# Patient Record
Sex: FEMALE | Race: OTHER | Employment: FULL TIME | URBAN - METROPOLITAN AREA
[De-identification: names, ages, dates, MRNs, and addresses within clinical notes are randomized per-mention and may not be internally consistent; named-entity substitution may affect disease eponyms.]

---

## 2017-02-02 ENCOUNTER — HOSPITAL ENCOUNTER (OUTPATIENT)
Age: 37
Discharge: HOME | End: 2017-02-02
Attending: EMERGENCY MEDICINE
Payer: COMMERCIAL

## 2017-02-02 VITALS
TEMPERATURE: 98 F | SYSTOLIC BLOOD PRESSURE: 139 MMHG | HEART RATE: 82 BPM | RESPIRATION RATE: 16 BRPM | HEIGHT: 67 IN | OXYGEN SATURATION: 100 % | WEIGHT: 250 LBS | BODY MASS INDEX: 39.24 KG/M2 | DIASTOLIC BLOOD PRESSURE: 88 MMHG

## 2017-02-02 DIAGNOSIS — G43.101 MIGRAINE WITH AURA AND WITH STATUS MIGRAINOSUS, NOT INTRACTABLE: Primary | ICD-10-CM

## 2017-02-02 PROCEDURE — 99214 OFFICE O/P EST MOD 30 MIN: CPT

## 2017-02-02 PROCEDURE — 99213 OFFICE O/P EST LOW 20 MIN: CPT

## 2017-02-02 RX ORDER — PREDNISONE 20 MG/1
60 TABLET ORAL ONCE
Status: COMPLETED | OUTPATIENT
Start: 2017-02-02 | End: 2017-02-02

## 2017-02-02 RX ORDER — ONDANSETRON 4 MG/1
4 TABLET, ORALLY DISINTEGRATING ORAL EVERY 4 HOURS PRN
Qty: 10 TABLET | Refills: 0 | Status: SHIPPED | OUTPATIENT
Start: 2017-02-02 | End: 2017-02-09

## 2017-02-02 RX ORDER — METOCLOPRAMIDE 10 MG/1
10 TABLET ORAL EVERY 6 HOURS PRN
Qty: 10 TABLET | Refills: 0 | Status: SHIPPED | OUTPATIENT
Start: 2017-02-02 | End: 2017-03-04

## 2017-02-02 RX ORDER — ONDANSETRON 4 MG/1
4 TABLET, ORALLY DISINTEGRATING ORAL ONCE
Status: COMPLETED | OUTPATIENT
Start: 2017-02-02 | End: 2017-02-02

## 2017-02-02 NOTE — ED PROVIDER NOTES
Patient Seen in: [facility] Immediate Care In [address] [place] [address]    History   Patient presents with:  Headache    Stated Complaint: headache    HPI    Patient is a 30-year-old female with an extensive history of migraines who presents with complaints of rig Temp 02/02/17 0959 97.6 °F (36.4 °C)   Temp src --    SpO2 02/02/17 0959 100 %   O2 Device 02/02/17 0959 None (Room air)       Current:/88 mmHg  Pulse 82  Temp(Src) 97.6 °F (36.4 °C)  Resp 16  Ht 170.2 cm (5' 7\")  Wt 113.399 kg  BMI 39.15 kg/m2  S

## 2017-10-24 ENCOUNTER — LAB ENCOUNTER (OUTPATIENT)
Dept: LAB | Age: 37
End: 2017-10-24
Attending: FAMILY MEDICINE
Payer: COMMERCIAL

## 2017-10-24 ENCOUNTER — OFFICE VISIT (OUTPATIENT)
Dept: FAMILY MEDICINE CLINIC | Facility: CLINIC | Age: 37
End: 2017-10-24

## 2017-10-24 VITALS
DIASTOLIC BLOOD PRESSURE: 70 MMHG | HEART RATE: 81 BPM | SYSTOLIC BLOOD PRESSURE: 107 MMHG | BODY MASS INDEX: 45.43 KG/M2 | WEIGHT: 276 LBS | HEIGHT: 65.5 IN

## 2017-10-24 DIAGNOSIS — Z00.00 ROUTINE MEDICAL EXAM: ICD-10-CM

## 2017-10-24 DIAGNOSIS — N94.3 PREMENSTRUAL SYMPTOM: ICD-10-CM

## 2017-10-24 DIAGNOSIS — Z00.00 ROUTINE MEDICAL EXAM: Primary | ICD-10-CM

## 2017-10-24 DIAGNOSIS — E55.9 VITAMIN D DEFICIENCY: ICD-10-CM

## 2017-10-24 PROCEDURE — 80053 COMPREHEN METABOLIC PANEL: CPT

## 2017-10-24 PROCEDURE — 36415 COLL VENOUS BLD VENIPUNCTURE: CPT

## 2017-10-24 PROCEDURE — 99395 PREV VISIT EST AGE 18-39: CPT | Performed by: FAMILY MEDICINE

## 2017-10-24 PROCEDURE — 85025 COMPLETE CBC W/AUTO DIFF WBC: CPT

## 2017-10-24 PROCEDURE — 84443 ASSAY THYROID STIM HORMONE: CPT

## 2017-10-24 PROCEDURE — 80061 LIPID PANEL: CPT

## 2017-10-24 PROCEDURE — 82306 VITAMIN D 25 HYDROXY: CPT

## 2017-10-24 RX ORDER — ONDANSETRON 4 MG/1
4 TABLET, ORALLY DISINTEGRATING ORAL EVERY 8 HOURS PRN
Qty: 9 TABLET | Refills: 0 | Status: SHIPPED | OUTPATIENT
Start: 2017-10-24 | End: 2018-02-02

## 2017-10-24 RX ORDER — NAPROXEN 500 MG/1
500 TABLET ORAL 2 TIMES DAILY WITH MEALS
Qty: 60 TABLET | Refills: 1 | Status: SHIPPED | OUTPATIENT
Start: 2017-10-24 | End: 2018-02-02

## 2017-10-24 NOTE — PROGRESS NOTES
HPI:   Marycruz Girard is a 30year old female who presents for a complete physical exam.    Regular check up. Menses has been regular. Reports unsure why weight gain. More stress at work.    Has been trying to get pregnant for past year - does have regular adenopathy,no bruits  CHEST: no chest tenderness  LUNGS: clear to auscultation  CARDIO: RRR without murmur  GI: good BS's,no masses, HSM or tenderness  MUSCULOSKELETAL: back is not tender,FROM of the back  EXTREMITIES: no cyanosis, clubbing or edema  NEURO

## 2017-10-30 NOTE — PROGRESS NOTES
Decreased vitamin D levels - should take daily over the counter vitamin D 2000 units. Cholesterol is elevated. Work on dietary changes and exercise. Also white blood cells were elevated. Are you feeling sick? Like cold symptoms?

## 2017-11-15 ENCOUNTER — TELEPHONE (OUTPATIENT)
Dept: OTHER | Age: 37
End: 2017-11-15

## 2017-11-15 ENCOUNTER — PATIENT MESSAGE (OUTPATIENT)
Dept: FAMILY MEDICINE CLINIC | Facility: CLINIC | Age: 37
End: 2017-11-15

## 2017-11-15 NOTE — TELEPHONE ENCOUNTER
From: Aicha Zuniga  To:  Lety Gamble MD  Sent: 11/15/2017  2:31 PM CST  Subject: Prescription Noralyn Orf Dr. Estrella Barbosa - since my last visit with you, I found out I am pregnant :), but I've had bad headaches since.  I have only taken Tylenol since

## 2017-11-15 NOTE — TELEPHONE ENCOUNTER
please see pt mychart message below. I called pt and she stated that she usually gets the headaches with her menstrual period  and you had started her on Naproxen but she found out she was pregnant.  Pt stated that her pervious Dr will give her Stephanieeno

## 2017-11-15 NOTE — TELEPHONE ENCOUNTER
From: Aisha Reed  To: Rc Vidal MD  Sent: 11/15/2017 2:31 PM CST  Subject: Prescription Gwen Ramp Dr. Maldonado Mondragon - since my last visit with you, I found out I am pregnant :), but I've had bad headaches since.  I have only taken Tylenol since I

## 2017-11-17 ENCOUNTER — TELEPHONE (OUTPATIENT)
Dept: PEDIATRICS CLINIC | Facility: CLINIC | Age: 37
End: 2017-11-17

## 2017-12-09 ENCOUNTER — NURSE ONLY (OUTPATIENT)
Dept: OBGYN CLINIC | Facility: CLINIC | Age: 37
End: 2017-12-09

## 2017-12-09 ENCOUNTER — TELEPHONE (OUTPATIENT)
Dept: OBGYN CLINIC | Facility: CLINIC | Age: 37
End: 2017-12-09

## 2017-12-09 ENCOUNTER — LAB ENCOUNTER (OUTPATIENT)
Dept: LAB | Facility: HOSPITAL | Age: 37
End: 2017-12-09
Attending: OBSTETRICS & GYNECOLOGY
Payer: COMMERCIAL

## 2017-12-09 VITALS — BODY MASS INDEX: 46.09 KG/M2 | HEIGHT: 65.5 IN | WEIGHT: 280 LBS

## 2017-12-09 DIAGNOSIS — Z34.81 ENCOUNTER FOR SUPERVISION OF OTHER NORMAL PREGNANCY IN FIRST TRIMESTER: Primary | ICD-10-CM

## 2017-12-09 DIAGNOSIS — Z34.81 ENCOUNTER FOR SUPERVISION OF OTHER NORMAL PREGNANCY IN FIRST TRIMESTER: ICD-10-CM

## 2017-12-09 PROCEDURE — 87086 URINE CULTURE/COLONY COUNT: CPT

## 2017-12-09 PROCEDURE — 86901 BLOOD TYPING SEROLOGIC RH(D): CPT

## 2017-12-09 PROCEDURE — 81025 URINE PREGNANCY TEST: CPT | Performed by: OBSTETRICS & GYNECOLOGY

## 2017-12-09 PROCEDURE — 87340 HEPATITIS B SURFACE AG IA: CPT

## 2017-12-09 PROCEDURE — 86780 TREPONEMA PALLIDUM: CPT

## 2017-12-09 PROCEDURE — 86900 BLOOD TYPING SEROLOGIC ABO: CPT

## 2017-12-09 PROCEDURE — 82950 GLUCOSE TEST: CPT

## 2017-12-09 PROCEDURE — 86850 RBC ANTIBODY SCREEN: CPT

## 2017-12-09 PROCEDURE — 85025 COMPLETE CBC W/AUTO DIFF WBC: CPT

## 2017-12-09 PROCEDURE — 36415 COLL VENOUS BLD VENIPUNCTURE: CPT

## 2017-12-09 PROCEDURE — 87389 HIV-1 AG W/HIV-1&-2 AB AG IA: CPT

## 2017-12-09 PROCEDURE — 86762 RUBELLA ANTIBODY: CPT

## 2017-12-09 NOTE — TELEPHONE ENCOUNTER
Pt had her OBN today. Pt takes Tylenol with codeine for when she gets a bad tension headache. Pt has taken it twice since she has been pregnant. Sent to MD on Call, Anahi Pereira. Can pt continue to take it as needed.

## 2017-12-09 NOTE — PROGRESS NOTES
Pt seen for OBN appt today with no complaints. Normal PN labs ordered, 1 hr.   Pt advised all labs must be completed and resulted prior to MD appt. Pt walked to  to schedule NPN appt with MD.    BMI 45.2.   Informed pt that MD will discuss if pt Sickle Cell Disease or trait No    Ted-Sachs Disease No    Thalassemia No    Other inherited genetic or chromosomal disorders No    Patient or baby's father had a child with birth defects not listed above No    Previous miscarriages or stillborn No

## 2017-12-11 ENCOUNTER — TELEPHONE (OUTPATIENT)
Dept: OBGYN CLINIC | Facility: CLINIC | Age: 37
End: 2017-12-11

## 2017-12-11 DIAGNOSIS — O28.9 ABNORMAL FINDINGS ON ANTENATAL SCREENING: Primary | ICD-10-CM

## 2017-12-11 NOTE — TELEPHONE ENCOUNTER
Pt informed of KCBs recs and verbalized understanding. Pt also advised to increase fluid intake as well.

## 2017-12-11 NOTE — TELEPHONE ENCOUNTER
----- Message from Madison Rivas MD sent at 12/11/2017  8:04 AM CST -----  Patient did not pass GTT and will need three hour GTT

## 2017-12-12 NOTE — TELEPHONE ENCOUNTER
PT NOTIFIED OF RESULTS AND RECS. INSTRUCTIONS GIVEN FOR FASTING AND PHONE NUMBER GIVEN FOR [address]. ENCOURAGED TO CALL BACK WITH ANY QUESTIONS OR CONCERNS.

## 2017-12-14 ENCOUNTER — INITIAL PRENATAL (OUTPATIENT)
Dept: OBGYN CLINIC | Facility: CLINIC | Age: 37
End: 2017-12-14

## 2017-12-14 ENCOUNTER — TELEPHONE (OUTPATIENT)
Dept: OBGYN CLINIC | Facility: CLINIC | Age: 37
End: 2017-12-14

## 2017-12-14 VITALS
DIASTOLIC BLOOD PRESSURE: 82 MMHG | BODY MASS INDEX: 46 KG/M2 | WEIGHT: 280 LBS | HEART RATE: 76 BPM | SYSTOLIC BLOOD PRESSURE: 140 MMHG

## 2017-12-14 DIAGNOSIS — Z34.91 ENCOUNTER FOR SUPERVISION OF NORMAL PREGNANCY IN FIRST TRIMESTER, UNSPECIFIED GRAVIDITY: Primary | ICD-10-CM

## 2017-12-14 PROCEDURE — 81002 URINALYSIS NONAUTO W/O SCOPE: CPT | Performed by: OBSTETRICS & GYNECOLOGY

## 2017-12-14 PROCEDURE — 90686 IIV4 VACC NO PRSV 0.5 ML IM: CPT | Performed by: OBSTETRICS & GYNECOLOGY

## 2017-12-14 PROCEDURE — 90471 IMMUNIZATION ADMIN: CPT | Performed by: OBSTETRICS & GYNECOLOGY

## 2017-12-15 NOTE — PROGRESS NOTES
31 yo  @ 9w0d by LMP here for NOB visit. No complaints. BMI 45. Discussed sleep study and echo. Discussed Level 2 and growth scan. Pt undecided on genetics. Failed 1h gtt and has 3h gtt scheduled in 2 days. PE wnl. LPS 16- neg/neg.   PE

## 2017-12-15 NOTE — TELEPHONE ENCOUNTER
Patient's order for level 2/growth Us was routed to dmg . Patient given [phone] to call and schedule testing.

## 2017-12-15 NOTE — TELEPHONE ENCOUNTER
LMTCB. ORDERS PLACED FOR SLEEP STUDY AND MATERNAL ECHO. PT NEEDS TO CALL [phone] TO SCHEDULE SLEEP STUDY AND NEEDS TO CALL CENTRAL SCHEDULING, [phone] TO SCHEDULE MATERNAL ECHO.   PT SHOULD ALSO CALL HER INSURANCE TO FIND OUT IF ANY PRIOR AUTH I

## 2017-12-15 NOTE — TELEPHONE ENCOUNTER
Patient needs level 2 with monthly growth scans, maternal echo, and sleep study for BMI 45. Please coordinate. Thanks!

## 2017-12-16 ENCOUNTER — LAB ENCOUNTER (OUTPATIENT)
Dept: LAB | Age: 37
End: 2017-12-16
Attending: OBSTETRICS & GYNECOLOGY
Payer: COMMERCIAL

## 2017-12-16 DIAGNOSIS — O28.9 ABNORMAL FINDINGS ON ANTENATAL SCREENING: ICD-10-CM

## 2017-12-16 PROCEDURE — 82952 GTT-ADDED SAMPLES: CPT

## 2017-12-16 PROCEDURE — 82951 GLUCOSE TOLERANCE TEST (GTT): CPT

## 2017-12-16 PROCEDURE — 36415 COLL VENOUS BLD VENIPUNCTURE: CPT

## 2017-12-19 ENCOUNTER — HOSPITAL ENCOUNTER (OUTPATIENT)
Dept: ULTRASOUND IMAGING | Age: 37
Discharge: HOME | End: 2017-12-19
Attending: OBSTETRICS & GYNECOLOGY
Payer: COMMERCIAL

## 2017-12-19 DIAGNOSIS — Z34.91 ENCOUNTER FOR SUPERVISION OF NORMAL PREGNANCY IN FIRST TRIMESTER, UNSPECIFIED GRAVIDITY: ICD-10-CM

## 2017-12-19 PROCEDURE — 76801 OB US < 14 WKS SINGLE FETUS: CPT | Performed by: OBSTETRICS & GYNECOLOGY

## 2017-12-20 NOTE — TELEPHONE ENCOUNTER
PT NOTIFIED OF NEED FOR SLEEP STUDY AND ECHO AND PHONE NUMBERS GIVEN FOR BOTH. PT WILL CHECK WITH HER INSURANCE AND CALL US BACK IF SHE NEEDS A PRIOR AUTH FOR THE SLEEP STUDY.

## 2017-12-27 ENCOUNTER — TELEPHONE (OUTPATIENT)
Dept: OBGYN CLINIC | Facility: CLINIC | Age: 37
End: 2017-12-27

## 2017-12-27 NOTE — TELEPHONE ENCOUNTER
PER PT STATE IS IT OK TO TAKE THE TYLENOL WITH CODEINE FOR A MIGRAINE HEADACHE / SHE STATE SHE'S IN A LOT OF PAIN / PLS ADV

## 2017-12-27 NOTE — TELEPHONE ENCOUNTER
Pt is 10w6d, reports a bad migraine and asking if she can take her Tylenol with codeine. Pt reports HA and pain behind right eye. She has tried coffee, pop, tylenol, and a cold gel mask with no relief.  Pt did ask about using this med at her OBN visit and w

## 2017-12-28 NOTE — TELEPHONE ENCOUNTER
Reviewed with VIDYA on call, see note. Pt aware OK to use T3 today but if another HA occurs she should keep a diary and let us know how often they come, as we prefer she does not use T3 often. Pt verbalized understanding.

## 2017-12-28 NOTE — TELEPHONE ENCOUNTER
Reviewed chart and OK to use rarely. She stated only twice since pregnant. This is acceptable use. We'll follow frequency of HA's.

## 2018-01-13 ENCOUNTER — ROUTINE PRENATAL (OUTPATIENT)
Dept: OBGYN CLINIC | Facility: CLINIC | Age: 38
End: 2018-01-13

## 2018-01-13 VITALS
SYSTOLIC BLOOD PRESSURE: 137 MMHG | WEIGHT: 278.81 LBS | DIASTOLIC BLOOD PRESSURE: 81 MMHG | HEART RATE: 82 BPM | BODY MASS INDEX: 46 KG/M2

## 2018-01-13 DIAGNOSIS — Z34.91 ENCOUNTER FOR SUPERVISION OF NORMAL PREGNANCY IN FIRST TRIMESTER, UNSPECIFIED GRAVIDITY: Primary | ICD-10-CM

## 2018-01-13 LAB
MULTISTIX LOT#: NORMAL NUMERIC
PH, URINE: 5 (ref 4.5–8)
SPECIFIC GRAVITY: 1.01 (ref 1–1.03)
UROBILINOGEN,SEMI-QN: 0 MG/DL (ref 0–1.9)

## 2018-01-13 PROCEDURE — 81002 URINALYSIS NONAUTO W/O SCOPE: CPT | Performed by: OBSTETRICS & GYNECOLOGY

## 2018-02-02 ENCOUNTER — HOSPITAL ENCOUNTER (OUTPATIENT)
Dept: PERINATAL CARE | Facility: HOSPITAL | Age: 38
Discharge: HOME | End: 2018-02-02
Attending: OBSTETRICS & GYNECOLOGY
Payer: COMMERCIAL

## 2018-02-02 VITALS — HEART RATE: 91 BPM | SYSTOLIC BLOOD PRESSURE: 153 MMHG | DIASTOLIC BLOOD PRESSURE: 68 MMHG

## 2018-02-02 DIAGNOSIS — O09.522 ELDERLY MULTIGRAVIDA IN SECOND TRIMESTER: ICD-10-CM

## 2018-02-02 PROBLEM — O09.529 AMA (ADVANCED MATERNAL AGE) MULTIGRAVIDA 35+: Status: ACTIVE | Noted: 2018-02-02

## 2018-02-02 PROBLEM — O09.529 AMA (ADVANCED MATERNAL AGE) MULTIGRAVIDA 35+ (HCC): Status: ACTIVE | Noted: 2018-02-02

## 2018-02-02 PROCEDURE — 76816 OB US FOLLOW-UP PER FETUS: CPT | Performed by: OBSTETRICS & GYNECOLOGY

## 2018-02-02 PROCEDURE — 76805 OB US >/= 14 WKS SNGL FETUS: CPT | Performed by: OBSTETRICS & GYNECOLOGY

## 2018-02-02 PROCEDURE — 99244 OFF/OP CNSLTJ NEW/EST MOD 40: CPT | Performed by: OBSTETRICS & GYNECOLOGY

## 2018-02-02 NOTE — PROGRESS NOTES
Outpatient Maternal-Fetal Medicine Consultation    Dear Dr. Anastasia Mahoney,    Thank you for requesting ultrasound evaluation and maternal fetal medicine consultation on your patient Radha Pena.   As you are aware she is a 30year old female with a Gavin Norse revealed fetal measurements consistent with dates. Posterior placenta. I interpreted the results and reviewed them with the patient.       DISCUSSION  During her visit we discussed and reviewed the following issues:  ADVANCED MATERNAL AGE    Background  [facility] is almost 30 percent, compared to almost 48 percent in women over age 36 to 39 and almost [de-identified] percent in women age 48 to 61.           Fetal Death        A decision analysis tool using data from the [employer]  [employer] predicte the diagnostic accuracy of these tests as well as the procedure associated loss rate (1:500 for genetic amniocentesis). She ultimately does not desire invasive genetic testing.      Non-invasive Pregnancy Testing (NIPT)  I reviewed current non-invasive s subsequent type 2 diabetes. _ passed her early 3 hour GTT but will need to have this repeated in the third trimester. An association between obesity and hypertensive disorders during pregnancy has been consistently reported.   In particular, [employer] 8-18 lbs.   We discussed the role of mild to moderate exercise, healthy food choices and appropriate portions sized to help achieve this goal.  Excess weight gain is associated with higher rates of gestational diabetes, hypertensive complications, fetal mac

## 2018-02-10 ENCOUNTER — ROUTINE PRENATAL (OUTPATIENT)
Dept: OBGYN CLINIC | Facility: CLINIC | Age: 38
End: 2018-02-10

## 2018-02-10 ENCOUNTER — TELEPHONE (OUTPATIENT)
Dept: OBGYN CLINIC | Facility: CLINIC | Age: 38
End: 2018-02-10

## 2018-02-10 VITALS
BODY MASS INDEX: 46 KG/M2 | DIASTOLIC BLOOD PRESSURE: 84 MMHG | SYSTOLIC BLOOD PRESSURE: 137 MMHG | WEIGHT: 279 LBS | HEART RATE: 74 BPM

## 2018-02-10 DIAGNOSIS — O99.212 MATERNAL MORBID OBESITY, ANTEPARTUM, SECOND TRIMESTER (HCC): ICD-10-CM

## 2018-02-10 DIAGNOSIS — E66.01 MATERNAL MORBID OBESITY, ANTEPARTUM, SECOND TRIMESTER (HCC): ICD-10-CM

## 2018-02-10 DIAGNOSIS — Z34.92 ENCOUNTER FOR SUPERVISION OF NORMAL PREGNANCY IN SECOND TRIMESTER, UNSPECIFIED GRAVIDITY: Primary | ICD-10-CM

## 2018-02-10 LAB
MULTISTIX LOT#: NORMAL NUMERIC
PH, URINE: 7 (ref 4.5–8)
SPECIFIC GRAVITY: 1.01 (ref 1–1.03)

## 2018-02-10 PROCEDURE — 81002 URINALYSIS NONAUTO W/O SCOPE: CPT | Performed by: OBSTETRICS & GYNECOLOGY

## 2018-02-10 NOTE — PROGRESS NOTES
No complaints. No VB. Level 2 US already scheduled. Needs sleep study and echo. Will send to nutritionist. Take BP at home and call if >140/90.   RTC 4 wks

## 2018-02-13 ENCOUNTER — TELEPHONE (OUTPATIENT)
Dept: OBGYN CLINIC | Facility: CLINIC | Age: 38
End: 2018-02-13

## 2018-02-13 NOTE — TELEPHONE ENCOUNTER
LMTCB. ORDER PLACED FOR SLEEP STUDY AND MATERNAL ECHO. PT TO CALL [phone] TO SCHEDULE HER SLEEP STUDY. PT SHOULD ALSO CHECK WITH HER INSURANCE TO FIND OUT IF HER INSURANCE REQUIRES ANY KIND PRIOR AUTHORIZATION FOR THE SLEEP STUDY.   PT NEEDS TO CA

## 2018-02-13 NOTE — TELEPHONE ENCOUNTER
Please notify patient MFM recs to see dietician during pregnancy to address diet. Please coordinate.

## 2018-02-13 NOTE — TELEPHONE ENCOUNTER
Lmtcb. Calling to inform patient that her referral for the dietician was entered and to give scheduling information [phone].

## 2018-02-19 NOTE — TELEPHONE ENCOUNTER
Pt informed of recs below and verbalized understanding. Pt stated she told Brooklynn Garza at time of PN visit that she is unable to do to sleep study at this time because she is working nights. Pt stated she is unsure when she will be able to do the sleep study.  Pt s

## 2018-02-20 NOTE — TELEPHONE ENCOUNTER
Noted. To be discussed at next PNV. Do they do sleep studies on the weekend ever? Or does her partner ever have a week night off?

## 2018-02-28 NOTE — TELEPHONE ENCOUNTER
LMTCB AT HOME #. SPOKE WITH JULIANNE AT [facility]. THEY DO SLEEP STUDIES FRI AND SAT NIGHTS AND ON SUN NIGHTS AT [facility].

## 2018-03-02 ENCOUNTER — HOSPITAL ENCOUNTER (OUTPATIENT)
Dept: PERINATAL CARE | Facility: HOSPITAL | Age: 38
Discharge: HOME | End: 2018-03-02
Attending: OBSTETRICS & GYNECOLOGY
Payer: COMMERCIAL

## 2018-03-02 ENCOUNTER — TELEPHONE (OUTPATIENT)
Dept: OBGYN CLINIC | Facility: CLINIC | Age: 38
End: 2018-03-02

## 2018-03-02 VITALS
HEART RATE: 105 BPM | RESPIRATION RATE: 16 BRPM | WEIGHT: 279 LBS | DIASTOLIC BLOOD PRESSURE: 66 MMHG | BODY MASS INDEX: 46 KG/M2 | SYSTOLIC BLOOD PRESSURE: 126 MMHG

## 2018-03-02 DIAGNOSIS — O09.522 ELDERLY MULTIGRAVIDA IN SECOND TRIMESTER: ICD-10-CM

## 2018-03-02 DIAGNOSIS — E66.01 MORBID OBESITY WITH BMI OF 45.0-49.9, ADULT (HCC): Primary | ICD-10-CM

## 2018-03-02 DIAGNOSIS — O99.210 OBESITY IN PREGNANCY: ICD-10-CM

## 2018-03-02 DIAGNOSIS — E66.01 MORBID OBESITY WITH BMI OF 45.0-49.9, ADULT (HCC): ICD-10-CM

## 2018-03-02 PROCEDURE — 76811 OB US DETAILED SNGL FETUS: CPT | Performed by: OBSTETRICS & GYNECOLOGY

## 2018-03-02 PROCEDURE — 99244 OFF/OP CNSLTJ NEW/EST MOD 40: CPT | Performed by: OBSTETRICS & GYNECOLOGY

## 2018-03-02 NOTE — PROGRESS NOTES
Outpatient Maternal-Fetal Medicine Consultation     Dear Dr. Victoriano Wright,     Thank you for requesting ultrasound evaluation and maternal fetal medicine consultation on your patient Marycruz Girard.   As you are aware she is a 30year old female with a Singleto (initiating at 39 weeks gestation for women 35-39 years and at 28 weeks gestation for women 40 years and older) are also advised.  Routine obstetric care is more than adequate to assess for gestational diabetes and preeclampsia; hence, no further significan performed to avert one unexplained fetal death.   Hence, weekly NST's are advised for women of advanced maternal age; testing should be initiated at 42 weeks for women 35-39 years and at 26 weeks for women 36 years and older.     Fetal Malformations    Card rates.     OBESITY:  Her BMI was 45.9 prior to pregnancy  Obesity during pregnancy is associated with numerous maternal and  risks which were reviewed.   See prior MFM note for details.     We reviewed the current recommendations for limited gestat

## 2018-03-09 ENCOUNTER — ROUTINE PRENATAL (OUTPATIENT)
Dept: OBGYN CLINIC | Facility: CLINIC | Age: 38
End: 2018-03-09

## 2018-03-09 VITALS
SYSTOLIC BLOOD PRESSURE: 120 MMHG | DIASTOLIC BLOOD PRESSURE: 75 MMHG | HEART RATE: 83 BPM | WEIGHT: 284 LBS | BODY MASS INDEX: 47 KG/M2

## 2018-03-09 DIAGNOSIS — Z34.82 ENCOUNTER FOR SUPERVISION OF OTHER NORMAL PREGNANCY IN SECOND TRIMESTER: Primary | ICD-10-CM

## 2018-03-09 LAB
APPEARANCE: CLEAR
MULTISTIX LOT#: NORMAL NUMERIC
URINE-COLOR: YELLOW

## 2018-03-09 PROCEDURE — 81002 URINALYSIS NONAUTO W/O SCOPE: CPT | Performed by: OBSTETRICS & GYNECOLOGY

## 2018-03-10 NOTE — PROGRESS NOTES
Still has not done her sleep study or maternal echo due to no . Her daughter is 8years old. informed the patient she is to take her daughter with her to do both of these studies.  RTC 4 wks

## 2018-03-16 ENCOUNTER — HOSPITAL ENCOUNTER (OUTPATIENT)
Dept: CV DIAGNOSTICS | Facility: HOSPITAL | Age: 38
Discharge: HOME | End: 2018-03-16
Attending: OBSTETRICS & GYNECOLOGY
Payer: COMMERCIAL

## 2018-03-16 DIAGNOSIS — E66.01 MATERNAL MORBID OBESITY, ANTEPARTUM, SECOND TRIMESTER (HCC): ICD-10-CM

## 2018-03-16 DIAGNOSIS — O99.212 MATERNAL MORBID OBESITY, ANTEPARTUM, SECOND TRIMESTER (HCC): ICD-10-CM

## 2018-03-16 PROCEDURE — 93306 TTE W/DOPPLER COMPLETE: CPT | Performed by: OBSTETRICS & GYNECOLOGY

## 2018-03-20 ENCOUNTER — TELEPHONE (OUTPATIENT)
Dept: OBGYN CLINIC | Facility: CLINIC | Age: 38
End: 2018-03-20

## 2018-03-20 NOTE — TELEPHONE ENCOUNTER
Per the pt she is 22 weeks pregnant, and her blood pressure is elevated (141/81). The pt would like to speak with a nurse. Please advise.

## 2018-03-20 NOTE — TELEPHONE ENCOUNTER
Pt states that she checked her BP at home today and it was 140/81. Pt is nervous, can't remember if she should go to ER with that reading. Pt does not have hypertension, states it's \"borderline\".  Pt purchased a BP cuff for home use, checks it periodicall

## 2018-03-20 NOTE — TELEPHONE ENCOUNTER
Pt saw [address] 3/9 and again was told to go for sleep study and to bring daughter with if she needs to.

## 2018-03-21 NOTE — TELEPHONE ENCOUNTER
Informed pt that [facility] stated no need for pt to check BP  Unless specifically requested by us.   (Md will check at next office visit.)

## 2018-03-21 NOTE — TELEPHONE ENCOUNTER
Called pt to check on her BP at home. She states she took it last night after after relaxing and it was 119/73. Denies any dizziness, visual disturbances, headache, epigastric pain. Pt test her BP randomly.   Pt wants to know if she should test it once

## 2018-04-06 ENCOUNTER — ROUTINE PRENATAL (OUTPATIENT)
Dept: OBGYN CLINIC | Facility: CLINIC | Age: 38
End: 2018-04-06

## 2018-04-06 VITALS — DIASTOLIC BLOOD PRESSURE: 74 MMHG | BODY MASS INDEX: 47 KG/M2 | WEIGHT: 286 LBS | SYSTOLIC BLOOD PRESSURE: 119 MMHG

## 2018-04-06 DIAGNOSIS — Z34.82 ENCOUNTER FOR SUPERVISION OF OTHER NORMAL PREGNANCY IN SECOND TRIMESTER: Primary | ICD-10-CM

## 2018-04-06 PROCEDURE — 81002 URINALYSIS NONAUTO W/O SCOPE: CPT | Performed by: OBSTETRICS & GYNECOLOGY

## 2018-04-07 ENCOUNTER — LAB ENCOUNTER (OUTPATIENT)
Dept: LAB | Age: 38
End: 2018-04-07
Attending: OBSTETRICS & GYNECOLOGY
Payer: COMMERCIAL

## 2018-04-07 DIAGNOSIS — Z34.82 ENCOUNTER FOR SUPERVISION OF OTHER NORMAL PREGNANCY IN SECOND TRIMESTER: ICD-10-CM

## 2018-04-07 PROCEDURE — 36415 COLL VENOUS BLD VENIPUNCTURE: CPT

## 2018-04-07 PROCEDURE — 85025 COMPLETE CBC W/AUTO DIFF WBC: CPT

## 2018-04-07 PROCEDURE — 82950 GLUCOSE TEST: CPT

## 2018-04-12 ENCOUNTER — TELEPHONE (OUTPATIENT)
Dept: OBGYN CLINIC | Facility: CLINIC | Age: 38
End: 2018-04-12

## 2018-04-12 DIAGNOSIS — O99.810 ABNORMAL MATERNAL GLUCOSE TOLERANCE, ANTEPARTUM: Primary | ICD-10-CM

## 2018-04-12 NOTE — TELEPHONE ENCOUNTER
Informed pt that her 1 hr gtt was 155 and she did not pass and needs to do the 3 hr gtt. Gave pt the fasting instructions for the 3 hr gtt and phone number to schedule it.

## 2018-04-27 ENCOUNTER — ROUTINE PRENATAL (OUTPATIENT)
Dept: OBGYN CLINIC | Facility: CLINIC | Age: 38
End: 2018-04-27

## 2018-04-27 VITALS
SYSTOLIC BLOOD PRESSURE: 122 MMHG | HEART RATE: 76 BPM | DIASTOLIC BLOOD PRESSURE: 77 MMHG | WEIGHT: 293 LBS | BODY MASS INDEX: 48 KG/M2

## 2018-04-27 DIAGNOSIS — Z34.93 ENCOUNTER FOR SUPERVISION OF NORMAL PREGNANCY IN THIRD TRIMESTER, UNSPECIFIED GRAVIDITY: Primary | ICD-10-CM

## 2018-04-27 PROCEDURE — 81002 URINALYSIS NONAUTO W/O SCOPE: CPT | Performed by: OBSTETRICS & GYNECOLOGY

## 2018-05-04 ENCOUNTER — HOSPITAL ENCOUNTER (OUTPATIENT)
Dept: PERINATAL CARE | Facility: HOSPITAL | Age: 38
Discharge: HOME | End: 2018-05-04
Attending: OBSTETRICS & GYNECOLOGY
Payer: COMMERCIAL

## 2018-05-04 VITALS — SYSTOLIC BLOOD PRESSURE: 135 MMHG | DIASTOLIC BLOOD PRESSURE: 74 MMHG

## 2018-05-04 DIAGNOSIS — O09.523 ELDERLY MULTIGRAVIDA IN THIRD TRIMESTER: ICD-10-CM

## 2018-05-04 DIAGNOSIS — O99.213 OBESITY AFFECTING PREGNANCY IN THIRD TRIMESTER: ICD-10-CM

## 2018-05-04 DIAGNOSIS — O09.523 ELDERLY MULTIGRAVIDA IN THIRD TRIMESTER: Primary | ICD-10-CM

## 2018-05-04 PROCEDURE — 76805 OB US >/= 14 WKS SNGL FETUS: CPT | Performed by: OBSTETRICS & GYNECOLOGY

## 2018-05-04 NOTE — PROGRESS NOTES
OB ULTRASOUND REPORT    Encounter for a fetal ultrasound at the request of Dr. Hina Lopez.     See imaging tab for complete ultrasound report or in PACS    Fetal Heart Rate: Present 145 bpm  Fetal Presentation: Vertex  Amniotic fluid MVP: WNL  Cord: 3 vessel c

## 2018-05-11 ENCOUNTER — ROUTINE PRENATAL (OUTPATIENT)
Dept: OBGYN CLINIC | Facility: CLINIC | Age: 38
End: 2018-05-11

## 2018-05-11 VITALS
DIASTOLIC BLOOD PRESSURE: 79 MMHG | SYSTOLIC BLOOD PRESSURE: 125 MMHG | BODY MASS INDEX: 49 KG/M2 | WEIGHT: 293 LBS | HEART RATE: 86 BPM

## 2018-05-11 DIAGNOSIS — Z34.93 ENCOUNTER FOR SUPERVISION OF NORMAL PREGNANCY IN THIRD TRIMESTER, UNSPECIFIED GRAVIDITY: Primary | ICD-10-CM

## 2018-05-11 PROCEDURE — 90471 IMMUNIZATION ADMIN: CPT | Performed by: OBSTETRICS & GYNECOLOGY

## 2018-05-11 PROCEDURE — 90715 TDAP VACCINE 7 YRS/> IM: CPT | Performed by: OBSTETRICS & GYNECOLOGY

## 2018-05-11 PROCEDURE — 81002 URINALYSIS NONAUTO W/O SCOPE: CPT | Performed by: OBSTETRICS & GYNECOLOGY

## 2018-05-11 NOTE — PROGRESS NOTES
TDap administered to pt's right deltoid. Advised pt injection site can be sore for a couple days. Provided pt with VIS form and encouraged to call us with questions or concerns.

## 2018-05-25 ENCOUNTER — ROUTINE PRENATAL (OUTPATIENT)
Dept: OBGYN CLINIC | Facility: CLINIC | Age: 38
End: 2018-05-25

## 2018-05-25 VITALS
DIASTOLIC BLOOD PRESSURE: 78 MMHG | WEIGHT: 293 LBS | SYSTOLIC BLOOD PRESSURE: 113 MMHG | BODY MASS INDEX: 49 KG/M2 | HEART RATE: 86 BPM

## 2018-05-25 DIAGNOSIS — Z34.93 ENCOUNTER FOR SUPERVISION OF NORMAL PREGNANCY IN THIRD TRIMESTER, UNSPECIFIED GRAVIDITY: Primary | ICD-10-CM

## 2018-05-25 PROCEDURE — 81002 URINALYSIS NONAUTO W/O SCOPE: CPT | Performed by: OBSTETRICS & GYNECOLOGY

## 2018-05-25 NOTE — PROGRESS NOTES
No complaints. Reviewed need for 3 hour GTT ASAP- pt states she will do it early next week. Reviewed need for NSTs at 36 weeks- will need order still.    RTC 2 wks

## 2018-05-29 ENCOUNTER — TELEPHONE (OUTPATIENT)
Dept: OBGYN CLINIC | Facility: CLINIC | Age: 38
End: 2018-05-29

## 2018-05-29 NOTE — TELEPHONE ENCOUNTER
Pt asking if she needs to fast for CBC. Pt informed she does not need to fast and go to lab as a walk in basis. Pt verbalized understanding.

## 2018-06-01 ENCOUNTER — LAB ENCOUNTER (OUTPATIENT)
Dept: LAB | Age: 38
End: 2018-06-01
Attending: OBSTETRICS & GYNECOLOGY
Payer: COMMERCIAL

## 2018-06-01 DIAGNOSIS — O99.810 ABNORMAL MATERNAL GLUCOSE TOLERANCE, ANTEPARTUM: ICD-10-CM

## 2018-06-01 DIAGNOSIS — Z34.93 ENCOUNTER FOR SUPERVISION OF NORMAL PREGNANCY IN THIRD TRIMESTER, UNSPECIFIED GRAVIDITY: ICD-10-CM

## 2018-06-01 PROCEDURE — 87389 HIV-1 AG W/HIV-1&-2 AB AG IA: CPT

## 2018-06-01 PROCEDURE — 82951 GLUCOSE TOLERANCE TEST (GTT): CPT

## 2018-06-01 PROCEDURE — 86780 TREPONEMA PALLIDUM: CPT

## 2018-06-01 PROCEDURE — 85027 COMPLETE CBC AUTOMATED: CPT

## 2018-06-01 PROCEDURE — 82952 GTT-ADDED SAMPLES: CPT

## 2018-06-01 PROCEDURE — 36415 COLL VENOUS BLD VENIPUNCTURE: CPT

## 2018-06-06 ENCOUNTER — TELEPHONE (OUTPATIENT)
Dept: OBGYN CLINIC | Facility: CLINIC | Age: 38
End: 2018-06-06

## 2018-06-06 DIAGNOSIS — O24.419 GESTATIONAL DIABETES MELLITUS (GDM) IN THIRD TRIMESTER, GESTATIONAL DIABETES METHOD OF CONTROL UNSPECIFIED: Primary | ICD-10-CM

## 2018-06-06 NOTE — TELEPHONE ENCOUNTER
----- Message from Darren Nichols MD sent at 6/4/2018  1:29 PM CDT -----  Failed 3 hr GTT.   Needs to see DM educator

## 2018-06-06 NOTE — TELEPHONE ENCOUNTER
----- Message from Loni Blanco MD sent at 6/4/2018  1:29 PM CDT -----  Failed 3 hr GTT.   Needs to see DM educator

## 2018-06-08 ENCOUNTER — TELEPHONE (OUTPATIENT)
Dept: OBGYN CLINIC | Facility: CLINIC | Age: 38
End: 2018-06-08

## 2018-06-08 ENCOUNTER — TELEPHONE (OUTPATIENT)
Dept: ENDOCRINOLOGY | Facility: HOSPITAL | Age: 38
End: 2018-06-08

## 2018-06-08 ENCOUNTER — ROUTINE PRENATAL (OUTPATIENT)
Dept: OBGYN CLINIC | Facility: CLINIC | Age: 38
End: 2018-06-08

## 2018-06-08 ENCOUNTER — APPOINTMENT (OUTPATIENT)
Dept: ENDOCRINOLOGY | Facility: HOSPITAL | Age: 38
End: 2018-06-08
Attending: OBSTETRICS & GYNECOLOGY
Payer: COMMERCIAL

## 2018-06-08 VITALS
HEART RATE: 75 BPM | DIASTOLIC BLOOD PRESSURE: 79 MMHG | BODY MASS INDEX: 49 KG/M2 | SYSTOLIC BLOOD PRESSURE: 118 MMHG | WEIGHT: 293 LBS

## 2018-06-08 DIAGNOSIS — O24.419 GESTATIONAL DIABETES MELLITUS (GDM) IN THIRD TRIMESTER, GESTATIONAL DIABETES METHOD OF CONTROL UNSPECIFIED: ICD-10-CM

## 2018-06-08 DIAGNOSIS — O99.210 OBESITY IN PREGNANCY: ICD-10-CM

## 2018-06-08 DIAGNOSIS — E66.01 MORBID OBESITY (HCC): ICD-10-CM

## 2018-06-08 DIAGNOSIS — O09.523 ELDERLY MULTIGRAVIDA IN THIRD TRIMESTER: Primary | ICD-10-CM

## 2018-06-08 DIAGNOSIS — O09.523 ELDERLY MULTIGRAVIDA IN THIRD TRIMESTER: ICD-10-CM

## 2018-06-08 DIAGNOSIS — Z34.93 ENCOUNTER FOR SUPERVISION OF NORMAL PREGNANCY IN THIRD TRIMESTER, UNSPECIFIED GRAVIDITY: Primary | ICD-10-CM

## 2018-06-08 PROCEDURE — 81002 URINALYSIS NONAUTO W/O SCOPE: CPT | Performed by: OBSTETRICS & GYNECOLOGY

## 2018-06-08 NOTE — TELEPHONE ENCOUNTER
I CALLED THE PHARMACY AND GAVE VERBAL ORDER TO DISPENSE GLUCOMETER, LANCETS AND TEST STRIPS, AND TO GIVE WHICHEVER BRAND HER INSURANCE COVERS. PT NOTIFIED. STATES THE PHARMACY JUST CALLED HER TO CONFIRM HER INSURANCE.   STATES THERE IS A SUPPLEMENTAL FORM

## 2018-06-08 NOTE — PROGRESS NOTES
Cancelled this am DM education due to not covered by insurance -- informed pt we need formal diabetic education & testing done. Office BS 99. Start NSTs at 36 wks (if needs insulin, then needs to start now). Needs MFM growth u/s. RTC 2 wks.

## 2018-06-08 NOTE — TELEPHONE ENCOUNTER
[facility] CONFIRMED PT DID GO TO THEIR OFFICE AND SHE WAS GIVEN SOME BASIC INFO ABOUT DIET AND CHECKING HER SUGARS. PT IS SCHEDULED FOR THE CLASS ON THURS EVENING.   PT TOLD HER THERE IS A SPECIAL FUND THROUGH THE INSURANCE/UNION FOR POSSIBLE INSTANCES LIKE TH

## 2018-06-08 NOTE — TELEPHONE ENCOUNTER
I CALLED PT'S INSURANCE AND WAS TOLD THEY WILL NOT COVER ANY KIND OF DIABETIC EDUCATION, IT IS NOT A COVERED BENEFIT.   I ASKED IF I COULD PROVIDE CLINICAL INFO AND LETTER OF MEDICAL NECESSITY AND WAS TOLD I CAN SEND INFO FOR A PREDETERMINATION BUT IT TAKES

## 2018-06-14 ENCOUNTER — HOSPITAL ENCOUNTER (OUTPATIENT)
Dept: ENDOCRINOLOGY | Facility: HOSPITAL | Age: 38
Discharge: HOME | End: 2018-06-14
Attending: OBSTETRICS & GYNECOLOGY
Payer: COMMERCIAL

## 2018-06-14 VITALS — WEIGHT: 293 LBS | BODY MASS INDEX: 49 KG/M2

## 2018-06-14 DIAGNOSIS — O24.410 DIET CONTROLLED GESTATIONAL DIABETES MELLITUS (GDM) IN THIRD TRIMESTER: Primary | ICD-10-CM

## 2018-06-15 ENCOUNTER — APPOINTMENT (OUTPATIENT)
Dept: ENDOCRINOLOGY | Facility: HOSPITAL | Age: 38
End: 2018-06-15
Attending: OBSTETRICS & GYNECOLOGY
Payer: COMMERCIAL

## 2018-06-15 ENCOUNTER — LAB ENCOUNTER (OUTPATIENT)
Dept: LAB | Age: 38
End: 2018-06-15
Attending: OBSTETRICS & GYNECOLOGY
Payer: COMMERCIAL

## 2018-06-15 DIAGNOSIS — Z34.93 ENCOUNTER FOR SUPERVISION OF NORMAL PREGNANCY IN THIRD TRIMESTER: Primary | ICD-10-CM

## 2018-06-15 PROCEDURE — 87389 HIV-1 AG W/HIV-1&-2 AB AG IA: CPT

## 2018-06-15 PROCEDURE — 86780 TREPONEMA PALLIDUM: CPT

## 2018-06-15 PROCEDURE — 36415 COLL VENOUS BLD VENIPUNCTURE: CPT

## 2018-06-15 PROCEDURE — 85027 COMPLETE CBC AUTOMATED: CPT

## 2018-06-15 NOTE — PROGRESS NOTES
Aisha Reed  : 1980 was seen for Gestational Diabetes Counseling: Individual/Group    Date: 2018   Start time: 6 pm  End time: 8 pm      Education:     GDM Overview:  Reviewed gestational diabetes as diagnosis including target blood glucose further questions at this time.     Maame Nielsen, RN, CDE

## 2018-06-22 ENCOUNTER — TELEPHONE (OUTPATIENT)
Dept: OBGYN CLINIC | Facility: CLINIC | Age: 38
End: 2018-06-22

## 2018-06-22 ENCOUNTER — HOSPITAL ENCOUNTER (OUTPATIENT)
Dept: PERINATAL CARE | Facility: HOSPITAL | Age: 38
Discharge: HOME | End: 2018-06-22
Attending: OBSTETRICS & GYNECOLOGY
Payer: COMMERCIAL

## 2018-06-22 ENCOUNTER — ROUTINE PRENATAL (OUTPATIENT)
Dept: OBGYN CLINIC | Facility: CLINIC | Age: 38
End: 2018-06-22

## 2018-06-22 ENCOUNTER — HOSPITAL ENCOUNTER (OUTPATIENT)
Dept: ENDOCRINOLOGY | Facility: HOSPITAL | Age: 38
Discharge: HOME | End: 2018-06-22
Attending: OBSTETRICS & GYNECOLOGY
Payer: COMMERCIAL

## 2018-06-22 VITALS
BODY MASS INDEX: 49 KG/M2 | HEART RATE: 76 BPM | WEIGHT: 293 LBS | DIASTOLIC BLOOD PRESSURE: 78 MMHG | SYSTOLIC BLOOD PRESSURE: 117 MMHG

## 2018-06-22 VITALS — DIASTOLIC BLOOD PRESSURE: 65 MMHG | SYSTOLIC BLOOD PRESSURE: 110 MMHG

## 2018-06-22 VITALS — BODY MASS INDEX: 49 KG/M2 | WEIGHT: 293 LBS

## 2018-06-22 DIAGNOSIS — O99.210 OBESITY IN PREGNANCY: ICD-10-CM

## 2018-06-22 DIAGNOSIS — O24.410 DIET CONTROLLED GESTATIONAL DIABETES MELLITUS (GDM) IN THIRD TRIMESTER: Primary | ICD-10-CM

## 2018-06-22 DIAGNOSIS — O09.523 ELDERLY MULTIGRAVIDA IN THIRD TRIMESTER: ICD-10-CM

## 2018-06-22 DIAGNOSIS — O24.419 GESTATIONAL DIABETES MELLITUS (GDM) IN THIRD TRIMESTER, GESTATIONAL DIABETES METHOD OF CONTROL UNSPECIFIED: ICD-10-CM

## 2018-06-22 DIAGNOSIS — Z34.93 ENCOUNTER FOR SUPERVISION OF NORMAL PREGNANCY IN THIRD TRIMESTER, UNSPECIFIED GRAVIDITY: Primary | ICD-10-CM

## 2018-06-22 PROCEDURE — 59025 FETAL NON-STRESS TEST: CPT | Performed by: OBSTETRICS & GYNECOLOGY

## 2018-06-22 PROCEDURE — 81002 URINALYSIS NONAUTO W/O SCOPE: CPT | Performed by: OBSTETRICS & GYNECOLOGY

## 2018-06-22 NOTE — TELEPHONE ENCOUNTER
Pt seen at office and needs to start insulin 2 NPH at night.   Please send Rx for NPH insulin and supplies to pharm

## 2018-06-22 NOTE — TELEPHONE ENCOUNTER
CALLED [facility] AND GAVE VERBAL ORDER FOR NOVOLIN NPH OR HUMULIN NPH (TO DISPENSE WHICHEVER ONE IS COVERED BY PT'S INSURANCE). ALSO ORDERED 1/2 CC SYRINGES WITH 31 GA NEEDLES.

## 2018-06-22 NOTE — PROGRESS NOTES
Valentíndon Quesada  : 1980 was seen for GDM Follow-Up Counseling    Date: 2018   Start time: 8:30 am End time: 9:30 am    Assessment: Wt 297 lb 11.2 oz   LMP 10/12/2017 (Exact Date)   BMI 48.79 kg/m²   Weight: Wt Readings from Last 6 Encounters:  0 disposal and preventing and treating hypoglyemia. Pt has MD appointment today. Reducing Risk:  Discussed management of (hyperglycemia, hypoglycemia) and when to call provider. Recommendations:      1. Follow recommended meal plan.    2. Test blo

## 2018-06-22 NOTE — PROGRESS NOTES
GBS.  Reviewed 35 wk labs. Start insulin 2 NPH at night. Had NST today. Has u/s on Monday. RTC 1 wk.

## 2018-06-22 NOTE — NST
Nonstress Test   Patient: Xavi Monsivais    Gestation: 36w1d    NST: morbid obesity gdm       Variability: Moderate           Accelerations: Yes           Decelerations: None            Baseline: 130 BPM           Uterine Irritability: No           Contract

## 2018-06-25 ENCOUNTER — HOSPITAL ENCOUNTER (OUTPATIENT)
Dept: PERINATAL CARE | Facility: HOSPITAL | Age: 38
Discharge: HOME | End: 2018-06-25
Attending: OBSTETRICS & GYNECOLOGY
Payer: COMMERCIAL

## 2018-06-25 VITALS — HEART RATE: 93 BPM | DIASTOLIC BLOOD PRESSURE: 73 MMHG | SYSTOLIC BLOOD PRESSURE: 132 MMHG

## 2018-06-25 DIAGNOSIS — O24.410 DIET CONTROLLED GESTATIONAL DIABETES MELLITUS (GDM) IN THIRD TRIMESTER: ICD-10-CM

## 2018-06-25 DIAGNOSIS — E66.01 MORBID OBESITY (HCC): ICD-10-CM

## 2018-06-25 DIAGNOSIS — O24.410 DIET CONTROLLED GESTATIONAL DIABETES MELLITUS (GDM) IN THIRD TRIMESTER: Primary | ICD-10-CM

## 2018-06-25 DIAGNOSIS — O09.523 ELDERLY MULTIGRAVIDA IN THIRD TRIMESTER: ICD-10-CM

## 2018-06-25 PROCEDURE — 76819 FETAL BIOPHYS PROFIL W/O NST: CPT | Performed by: OBSTETRICS & GYNECOLOGY

## 2018-06-25 PROCEDURE — 99213 OFFICE O/P EST LOW 20 MIN: CPT | Performed by: OBSTETRICS & GYNECOLOGY

## 2018-06-25 PROCEDURE — 76805 OB US >/= 14 WKS SNGL FETUS: CPT | Performed by: OBSTETRICS & GYNECOLOGY

## 2018-06-25 PROCEDURE — 76816 OB US FOLLOW-UP PER FETUS: CPT | Performed by: OBSTETRICS & GYNECOLOGY

## 2018-06-25 NOTE — PROGRESS NOTES
Pt for Growth US  HX  ama obesity recent a2gdm 2 u nph at HS initiated 6/22/18   Denies pregnancy complaints  States active fetus

## 2018-06-25 NOTE — TELEPHONE ENCOUNTER
Pt states she was educated how to take insulin at her last Diabetic Education class. Pt states that she has been taking insulin since Friday, 6/22/18. Pt is taking 0 + 0 + 0 + 2N.

## 2018-06-25 NOTE — ADDENDUM NOTE
Encounter addended by: Evangelina Young on: 6/25/2018  2:58 PM<BR>    Actions taken: Visit diagnoses modified, Charge Capture section accepted

## 2018-06-25 NOTE — PROGRESS NOTES
Omar Gruber    Dear Dr. Luis Boaetng    Thank you for requesting ultrasound evaluation and maternal fetal medicine consultation on your patient Mona Rodriguez.   As you are aware she is a 41year old female  with a Singleto at 36 weeks    Thank you for allowing me to participate in the care of your patient. Please do not hesitate to contact me if additional questions or concerns arise. Sivan Piedra. Lucía Mora M.D.     The majority of the time (>50%) was spent in review of recor

## 2018-06-26 ENCOUNTER — TELEPHONE (OUTPATIENT)
Dept: OBGYN CLINIC | Facility: CLINIC | Age: 38
End: 2018-06-26

## 2018-06-26 RX ORDER — INSULIN LISPRO 100 [IU]/ML
2 INJECTION, SOLUTION INTRAVENOUS; SUBCUTANEOUS SEE ADMIN INSTRUCTIONS
Qty: 1 VIAL | Refills: 1 | Status: ON HOLD | OUTPATIENT
Start: 2018-06-26 | End: 2018-07-13

## 2018-06-26 NOTE — TELEPHONE ENCOUNTER
BS log reviewed by VIDYA and changes made. Called pt and informed pt of new insulin regimen (0 + 2 + 2 + 4N). Instructed pt her new meal time insulin is fact acting as opposed to her long acting that she takes at bedtime.  Advised to insure to have meal ready

## 2018-06-29 ENCOUNTER — ROUTINE PRENATAL (OUTPATIENT)
Dept: OBGYN CLINIC | Facility: CLINIC | Age: 38
End: 2018-06-29

## 2018-06-29 ENCOUNTER — HOSPITAL ENCOUNTER (OUTPATIENT)
Dept: PERINATAL CARE | Facility: HOSPITAL | Age: 38
Discharge: HOME | End: 2018-06-29
Attending: OBSTETRICS & GYNECOLOGY | Admitting: OBSTETRICS & GYNECOLOGY
Payer: COMMERCIAL

## 2018-06-29 ENCOUNTER — TELEPHONE (OUTPATIENT)
Dept: OBGYN CLINIC | Facility: CLINIC | Age: 38
End: 2018-06-29

## 2018-06-29 VITALS
DIASTOLIC BLOOD PRESSURE: 81 MMHG | SYSTOLIC BLOOD PRESSURE: 121 MMHG | HEART RATE: 68 BPM | WEIGHT: 293 LBS | BODY MASS INDEX: 49 KG/M2

## 2018-06-29 VITALS — DIASTOLIC BLOOD PRESSURE: 59 MMHG | HEART RATE: 82 BPM | SYSTOLIC BLOOD PRESSURE: 120 MMHG

## 2018-06-29 DIAGNOSIS — O24.419 GESTATIONAL DIABETES MELLITUS (GDM) IN THIRD TRIMESTER, GESTATIONAL DIABETES METHOD OF CONTROL UNSPECIFIED: ICD-10-CM

## 2018-06-29 DIAGNOSIS — Z34.93 ENCOUNTER FOR SUPERVISION OF NORMAL PREGNANCY IN THIRD TRIMESTER, UNSPECIFIED GRAVIDITY: Primary | ICD-10-CM

## 2018-06-29 DIAGNOSIS — O99.210 OBESITY IN PREGNANCY: ICD-10-CM

## 2018-06-29 DIAGNOSIS — O09.523 ELDERLY MULTIGRAVIDA IN THIRD TRIMESTER: ICD-10-CM

## 2018-06-29 PROCEDURE — 59025 FETAL NON-STRESS TEST: CPT

## 2018-06-29 PROCEDURE — 81002 URINALYSIS NONAUTO W/O SCOPE: CPT | Performed by: OBSTETRICS & GYNECOLOGY

## 2018-06-29 NOTE — NST
Nonstress Test   Patient: Radha Pena    Gestation: 37w1d    NST:       Variability: Moderate           Accelerations: Yes           Decelerations: None            Baseline: 125 BPM           Uterine Irritability: No           Contractions: Not present

## 2018-06-29 NOTE — TELEPHONE ENCOUNTER
Needs more test strips.   Pt will call us tomorrow morning with BS results as I need more results to determine if inc is necessary

## 2018-06-29 NOTE — TELEPHONE ENCOUNTER
Called pts pharmacy and spoke with [facility]. Verbal given to [facility] for refill of pts test strips with PRN refills. [facility] also stated that pt needs refills on lancets as well. [facility] advised those can be refilled as well.  Pt informed and verbalized

## 2018-06-29 NOTE — PROGRESS NOTES
No issues reported. 54% growth. On 0+2+2+4N. To call tomorrow with updated bs results. Had nst today. Discussed IOL at 39 wks for a2gdm. RTC 1 wk.

## 2018-06-29 NOTE — ADDENDUM NOTE
Encounter addended by: Victor Manuel Brand MD on: 6/29/2018 10:57 AM<BR>    Actions taken: Sign clinical note

## 2018-06-30 ENCOUNTER — TELEPHONE (OUTPATIENT)
Dept: OBGYN CLINIC | Facility: CLINIC | Age: 38
End: 2018-06-30

## 2018-06-30 NOTE — TELEPHONE ENCOUNTER
BLOOD SUGARS RECORDED AND WILL REVIEW WITH CAP IN THE OFFICE. PT ALSO NEEDS A NEW RX SENT TO THE PHARMACY AS THEY WILL NOT REFILL HER LANCETS YET BECAUSE THE ORIGINAL DIRECTIONS DO NOT INDICATE SHE IS CHECKING HER SUGARS 4 TIMES DAILY.   SO THE INSURANCE W

## 2018-06-30 NOTE — TELEPHONE ENCOUNTER
SPOKE WITH [facility] PHARMACIST AND ADVISED TO CHANGE DIRECTIONS FOR CHECKING SUGARS TO 4 TIMES DAILY AND ADVISED TO DISPENSE WHICHEVER BRAND HER INSURANCE WILL COVER.   PHARMACIST THOUGHT PT MAY NEED A NEW MONITOR ALSO IF SHE IS TO GET WHAT IS COVERED BY PT'

## 2018-07-03 ENCOUNTER — TELEPHONE (OUTPATIENT)
Dept: OBGYN CLINIC | Facility: CLINIC | Age: 38
End: 2018-07-03

## 2018-07-03 NOTE — TELEPHONE ENCOUNTER
Informed pt that VIDYA reviewed her blood sugars. Informed pt that VIDYA stated she will now take 0 + 4 + 4 + 8N. Informed pt to send us her bs again in 3-4 days. Pt stated understanding.

## 2018-07-03 NOTE — TELEPHONE ENCOUNTER
RECEIVED BLOOD SUGARS BUT THE SHADING IN THE BACK MAKES THE LOG TOO HARD TO READ. CALLED AND SPOKE WITH PT AND GOT VERBAL BLOOD SUGARS.   BS'S TO VIDYA.

## 2018-07-06 ENCOUNTER — HOSPITAL ENCOUNTER (OUTPATIENT)
Dept: PERINATAL CARE | Facility: HOSPITAL | Age: 38
Discharge: HOME | End: 2018-07-06
Attending: OBSTETRICS & GYNECOLOGY
Payer: COMMERCIAL

## 2018-07-06 ENCOUNTER — ROUTINE PRENATAL (OUTPATIENT)
Dept: OBGYN CLINIC | Facility: CLINIC | Age: 38
End: 2018-07-06

## 2018-07-06 VITALS
HEART RATE: 76 BPM | BODY MASS INDEX: 49 KG/M2 | SYSTOLIC BLOOD PRESSURE: 117 MMHG | DIASTOLIC BLOOD PRESSURE: 78 MMHG | WEIGHT: 293 LBS

## 2018-07-06 DIAGNOSIS — O09.523 ELDERLY MULTIGRAVIDA IN THIRD TRIMESTER: ICD-10-CM

## 2018-07-06 DIAGNOSIS — O99.210 OBESITY IN PREGNANCY: ICD-10-CM

## 2018-07-06 DIAGNOSIS — Z34.93 ENCOUNTER FOR SUPERVISION OF NORMAL PREGNANCY IN THIRD TRIMESTER, UNSPECIFIED GRAVIDITY: Primary | ICD-10-CM

## 2018-07-06 DIAGNOSIS — O24.419 GESTATIONAL DIABETES MELLITUS (GDM) IN THIRD TRIMESTER, GESTATIONAL DIABETES METHOD OF CONTROL UNSPECIFIED: ICD-10-CM

## 2018-07-06 LAB
APPEARANCE: CLEAR
MULTISTIX LOT#: NORMAL NUMERIC
SPECIFIC GRAVITY: 1.02 (ref 1–1.03)
URINE-COLOR: YELLOW

## 2018-07-06 PROCEDURE — 81002 URINALYSIS NONAUTO W/O SCOPE: CPT | Performed by: OBSTETRICS & GYNECOLOGY

## 2018-07-06 PROCEDURE — 59025 FETAL NON-STRESS TEST: CPT | Performed by: OBSTETRICS & GYNECOLOGY

## 2018-07-06 RX ORDER — TRISODIUM CITRATE DIHYDRATE AND CITRIC ACID MONOHYDRATE 500; 334 MG/5ML; MG/5ML
30 SOLUTION ORAL AS NEEDED
Status: CANCELLED | OUTPATIENT
Start: 2018-07-06

## 2018-07-06 RX ORDER — AMMONIA INHALANTS 0.04 G/.3ML
0.3 INHALANT RESPIRATORY (INHALATION) AS NEEDED
Status: CANCELLED | OUTPATIENT
Start: 2018-07-06

## 2018-07-06 RX ORDER — TERBUTALINE SULFATE 1 MG/ML
0.25 INJECTION, SOLUTION SUBCUTANEOUS AS NEEDED
Status: CANCELLED | OUTPATIENT
Start: 2018-07-06

## 2018-07-06 RX ORDER — IBUPROFEN 600 MG/1
600 TABLET ORAL ONCE AS NEEDED
Status: CANCELLED | OUTPATIENT
Start: 2018-07-06 | End: 2018-07-08

## 2018-07-06 RX ORDER — SODIUM CHLORIDE 0.9 % (FLUSH) 0.9 %
10 SYRINGE (ML) INJECTION AS NEEDED
Status: CANCELLED | OUTPATIENT
Start: 2018-07-06

## 2018-07-06 RX ORDER — LIDOCAINE HYDROCHLORIDE 10 MG/ML
30 INJECTION, SOLUTION EPIDURAL; INFILTRATION; INTRACAUDAL; PERINEURAL ONCE
Status: CANCELLED | OUTPATIENT
Start: 2018-07-06 | End: 2018-07-06

## 2018-07-06 RX ORDER — DEXTROSE, SODIUM CHLORIDE, SODIUM LACTATE, POTASSIUM CHLORIDE, AND CALCIUM CHLORIDE 5; .6; .31; .03; .02 G/100ML; G/100ML; G/100ML; G/100ML; G/100ML
125 INJECTION, SOLUTION INTRAVENOUS CONTINUOUS
Status: CANCELLED | OUTPATIENT
Start: 2018-07-06

## 2018-07-06 NOTE — ADDENDUM NOTE
Encounter addended by: Lelia Jones MD on: 7/6/2018  1:15 PM<BR>    Actions taken: Sign clinical note, Charge Capture section accepted

## 2018-07-06 NOTE — NST
Nonstress Test   Patient: Jaspal Chaudhari    Gestation: 38w1d    NST: a2gdm obesity       Variability: Moderate           Accelerations: Yes           Decelerations: None            Baseline: 120 BPM           Uterine Irritability: No           Contractions:

## 2018-07-09 ENCOUNTER — HOSPITAL ENCOUNTER (OUTPATIENT)
Dept: PERINATAL CARE | Facility: HOSPITAL | Age: 38
Discharge: HOME | End: 2018-07-09
Attending: OBSTETRICS & GYNECOLOGY | Admitting: OBSTETRICS & GYNECOLOGY
Payer: COMMERCIAL

## 2018-07-09 ENCOUNTER — TELEPHONE (OUTPATIENT)
Dept: ADMINISTRATIVE | Age: 38
End: 2018-07-09

## 2018-07-09 VITALS — DIASTOLIC BLOOD PRESSURE: 59 MMHG | SYSTOLIC BLOOD PRESSURE: 114 MMHG | HEART RATE: 95 BPM

## 2018-07-09 DIAGNOSIS — O24.419 GESTATIONAL DIABETES MELLITUS (GDM) IN THIRD TRIMESTER, GESTATIONAL DIABETES METHOD OF CONTROL UNSPECIFIED: ICD-10-CM

## 2018-07-09 DIAGNOSIS — O99.210 OBESITY IN PREGNANCY: ICD-10-CM

## 2018-07-09 DIAGNOSIS — O09.523 ELDERLY MULTIGRAVIDA IN THIRD TRIMESTER: ICD-10-CM

## 2018-07-09 PROCEDURE — 59025 FETAL NON-STRESS TEST: CPT | Performed by: OBSTETRICS & GYNECOLOGY

## 2018-07-09 NOTE — NST
Nonstress Test   Patient: Chavo Misha    Gestation: 38w4d    NST:       Variability: Moderate           Accelerations: Yes           Decelerations: None            Baseline: 125 BPM           Uterine Irritability: No           Contractions: Not present

## 2018-07-09 NOTE — TELEPHONE ENCOUNTER
[employer] F[employer] form received in 19 e Adriana Casillas. Logged for processing. MORRO packet emailed to pt '[email or web address]'.  NK

## 2018-07-10 ENCOUNTER — HOSPITAL ENCOUNTER (INPATIENT)
Facility: HOSPITAL | Age: 38
LOS: 3 days | Discharge: HOME | End: 2018-07-13
Attending: OBSTETRICS & GYNECOLOGY | Admitting: OBSTETRICS & GYNECOLOGY
Payer: COMMERCIAL

## 2018-07-10 ENCOUNTER — HOSPITAL ENCOUNTER (INPATIENT)
Dept: OBGYN CLINIC | Facility: HOSPITAL | Age: 38
Discharge: HOME | End: 2018-07-10
Attending: OBSTETRICS & GYNECOLOGY
Payer: COMMERCIAL

## 2018-07-10 PROBLEM — O24.419 GESTATIONAL DIABETES: Status: ACTIVE | Noted: 2018-07-10

## 2018-07-10 PROBLEM — O24.419 GESTATIONAL DIABETES (HCC): Status: ACTIVE | Noted: 2018-07-10

## 2018-07-10 LAB
ANTIBODY SCREEN: NEGATIVE
ERYTHROCYTE [DISTWIDTH] IN BLOOD BY AUTOMATED COUNT: 15.3 % (ref 11–15)
GLUCOSE BLDC GLUCOMTR-MCNC: 130 MG/DL (ref 70–99)
HCT VFR BLD AUTO: 37.1 % (ref 35–48)
HGB BLD-MCNC: 12.3 G/DL (ref 12–16)
MCH RBC QN AUTO: 27.4 PG (ref 27–32)
MCHC RBC AUTO-ENTMCNC: 33.2 G/DL (ref 32–37)
MCV RBC AUTO: 82.5 FL (ref 80–100)
PLATELET # BLD AUTO: 242 K/UL (ref 140–400)
PMV BLD AUTO: 9.6 FL (ref 7.4–10.3)
RBC # BLD AUTO: 4.5 M/UL (ref 3.7–5.4)
RH BLOOD TYPE: POSITIVE
WBC # BLD AUTO: 14.4 K/UL (ref 4–11)

## 2018-07-10 PROCEDURE — 59200 INSERT CERVICAL DILATOR: CPT | Performed by: OBSTETRICS & GYNECOLOGY

## 2018-07-10 PROCEDURE — 3E0P7VZ INTRODUCTION OF HORMONE INTO FEMALE REPRODUCTIVE, VIA NATURAL OR ARTIFICIAL OPENING: ICD-10-PCS | Performed by: OBSTETRICS & GYNECOLOGY

## 2018-07-10 RX ORDER — DEXTROSE, SODIUM CHLORIDE, SODIUM LACTATE, POTASSIUM CHLORIDE, AND CALCIUM CHLORIDE 5; .6; .31; .03; .02 G/100ML; G/100ML; G/100ML; G/100ML; G/100ML
125 INJECTION, SOLUTION INTRAVENOUS CONTINUOUS
Status: DISCONTINUED | OUTPATIENT
Start: 2018-07-10 | End: 2018-07-11 | Stop reason: HOSPADM

## 2018-07-10 RX ORDER — LIDOCAINE HYDROCHLORIDE 10 MG/ML
30 INJECTION, SOLUTION EPIDURAL; INFILTRATION; INTRACAUDAL; PERINEURAL ONCE
Status: DISCONTINUED | OUTPATIENT
Start: 2018-07-10 | End: 2018-07-11 | Stop reason: HOSPADM

## 2018-07-10 RX ORDER — IBUPROFEN 600 MG/1
600 TABLET ORAL ONCE AS NEEDED
Status: DISCONTINUED | OUTPATIENT
Start: 2018-07-10 | End: 2018-07-11 | Stop reason: HOSPADM

## 2018-07-10 RX ORDER — TRISODIUM CITRATE DIHYDRATE AND CITRIC ACID MONOHYDRATE 500; 334 MG/5ML; MG/5ML
30 SOLUTION ORAL AS NEEDED
Status: DISCONTINUED | OUTPATIENT
Start: 2018-07-10 | End: 2018-07-11 | Stop reason: HOSPADM

## 2018-07-10 RX ORDER — AMMONIA INHALANTS 0.04 G/.3ML
0.3 INHALANT RESPIRATORY (INHALATION) AS NEEDED
Status: DISCONTINUED | OUTPATIENT
Start: 2018-07-10 | End: 2018-07-11 | Stop reason: HOSPADM

## 2018-07-10 RX ORDER — SODIUM CHLORIDE 0.9 % (FLUSH) 0.9 %
10 SYRINGE (ML) INJECTION AS NEEDED
Status: DISCONTINUED | OUTPATIENT
Start: 2018-07-10 | End: 2018-07-11 | Stop reason: HOSPADM

## 2018-07-10 RX ORDER — TERBUTALINE SULFATE 1 MG/ML
0.25 INJECTION, SOLUTION SUBCUTANEOUS AS NEEDED
Status: DISCONTINUED | OUTPATIENT
Start: 2018-07-10 | End: 2018-07-11 | Stop reason: HOSPADM

## 2018-07-10 NOTE — TELEPHONE ENCOUNTER
Dr. Tilton Rubinstein    Please sign off on form:  -Highlight the patient and hit \"Chart\" button. -In Chart Review, w/in the Encounter tab - click 1 time on the Telephone call encounter for 7-9-18.  Scroll down the telephone encounter.  -Click \"scan on\" blue Hype

## 2018-07-11 LAB
GLUCOSE BLDC GLUCOMTR-MCNC: 108 MG/DL (ref 70–99)
GLUCOSE BLDC GLUCOMTR-MCNC: 110 MG/DL (ref 70–99)
GLUCOSE BLDC GLUCOMTR-MCNC: 122 MG/DL (ref 70–99)

## 2018-07-11 PROCEDURE — 10H07YZ INSERTION OF OTHER DEVICE INTO PRODUCTS OF CONCEPTION, VIA NATURAL OR ARTIFICIAL OPENING: ICD-10-PCS | Performed by: OBSTETRICS & GYNECOLOGY

## 2018-07-11 PROCEDURE — 4A1H7CZ MONITORING OF PRODUCTS OF CONCEPTION, CARDIAC RATE, VIA NATURAL OR ARTIFICIAL OPENING: ICD-10-PCS | Performed by: OBSTETRICS & GYNECOLOGY

## 2018-07-11 PROCEDURE — 59400 OBSTETRICAL CARE: CPT | Performed by: OBSTETRICS & GYNECOLOGY

## 2018-07-11 PROCEDURE — 10H073Z INSERTION OF MONITORING ELECTRODE INTO PRODUCTS OF CONCEPTION, VIA NATURAL OR ARTIFICIAL OPENING: ICD-10-PCS | Performed by: OBSTETRICS & GYNECOLOGY

## 2018-07-11 PROCEDURE — 3E033VJ INTRODUCTION OF OTHER HORMONE INTO PERIPHERAL VEIN, PERCUTANEOUS APPROACH: ICD-10-PCS | Performed by: OBSTETRICS & GYNECOLOGY

## 2018-07-11 PROCEDURE — 0HQ9XZZ REPAIR PERINEUM SKIN, EXTERNAL APPROACH: ICD-10-PCS | Performed by: OBSTETRICS & GYNECOLOGY

## 2018-07-11 PROCEDURE — 0UQMXZZ REPAIR VULVA, EXTERNAL APPROACH: ICD-10-PCS | Performed by: OBSTETRICS & GYNECOLOGY

## 2018-07-11 RX ORDER — IBUPROFEN 600 MG/1
600 TABLET ORAL EVERY 6 HOURS PRN
Status: DISCONTINUED | OUTPATIENT
Start: 2018-07-11 | End: 2018-07-13

## 2018-07-11 RX ORDER — 0.9 % SODIUM CHLORIDE 0.9 %
VIAL (ML) INJECTION
Status: DISCONTINUED
Start: 2018-07-11 | End: 2018-07-11 | Stop reason: WASHOUT

## 2018-07-11 RX ORDER — BISACODYL 10 MG
10 SUPPOSITORY, RECTAL RECTAL ONCE AS NEEDED
Status: DISCONTINUED | OUTPATIENT
Start: 2018-07-11 | End: 2018-07-13

## 2018-07-11 RX ORDER — AMMONIA INHALANTS 0.04 G/.3ML
0.3 INHALANT RESPIRATORY (INHALATION) AS NEEDED
Status: DISCONTINUED | OUTPATIENT
Start: 2018-07-11 | End: 2018-07-13

## 2018-07-11 RX ORDER — BUPIVACAINE HYDROCHLORIDE 2.5 MG/ML
INJECTION, SOLUTION EPIDURAL; INFILTRATION; INTRACAUDAL
Status: DISPENSED
Start: 2018-07-11 | End: 2018-07-12

## 2018-07-11 RX ORDER — DOCUSATE SODIUM 100 MG/1
100 CAPSULE, LIQUID FILLED ORAL 2 TIMES DAILY
Status: DISCONTINUED | OUTPATIENT
Start: 2018-07-11 | End: 2018-07-13

## 2018-07-11 RX ORDER — SODIUM CHLORIDE, SODIUM LACTATE, POTASSIUM CHLORIDE, CALCIUM CHLORIDE 600; 310; 30; 20 MG/100ML; MG/100ML; MG/100ML; MG/100ML
INJECTION, SOLUTION INTRAVENOUS
Status: DISPENSED
Start: 2018-07-11 | End: 2018-07-12

## 2018-07-11 RX ORDER — SODIUM CHLORIDE 0.9 % (FLUSH) 0.9 %
10 SYRINGE (ML) INJECTION AS NEEDED
Status: DISCONTINUED | OUTPATIENT
Start: 2018-07-11 | End: 2018-07-13

## 2018-07-11 RX ORDER — LIDOCAINE HYDROCHLORIDE AND EPINEPHRINE 20; 5 MG/ML; UG/ML
INJECTION, SOLUTION EPIDURAL; INFILTRATION; INTRACAUDAL; PERINEURAL
Status: DISPENSED
Start: 2018-07-11 | End: 2018-07-12

## 2018-07-11 RX ORDER — PRENATAL VIT,CAL 76/IRON/FOLIC 29 MG-1 MG
1 TABLET ORAL DAILY
Status: DISCONTINUED | OUTPATIENT
Start: 2018-07-11 | End: 2018-07-13

## 2018-07-11 RX ORDER — EPHEDRINE SULFATE/0.9% NACL/PF 25 MG/5 ML
SYRINGE (ML) INTRAVENOUS
Status: DISPENSED
Start: 2018-07-11 | End: 2018-07-12

## 2018-07-11 RX ORDER — ONDANSETRON 2 MG/ML
4 INJECTION INTRAMUSCULAR; INTRAVENOUS EVERY 6 HOURS PRN
Status: DISCONTINUED | OUTPATIENT
Start: 2018-07-11 | End: 2018-07-13

## 2018-07-11 RX ORDER — SIMETHICONE 80 MG
80 TABLET,CHEWABLE ORAL 3 TIMES DAILY PRN
Status: DISCONTINUED | OUTPATIENT
Start: 2018-07-11 | End: 2018-07-13

## 2018-07-11 RX ORDER — DIAPER,BRIEF,INFANT-TODD,DISP
1 EACH MISCELLANEOUS EVERY 6 HOURS PRN
Status: DISCONTINUED | OUTPATIENT
Start: 2018-07-11 | End: 2018-07-13

## 2018-07-11 NOTE — PROGRESS NOTES
[facility] - [facility]    Labor Progress Note    Aicha Lobe Patient Status:  Inpatient    1980 MRN [id number]   Location [address]  Attending Tex Giordano MD   Hosp Day # 1 PCP Mary Crabtree MD       Subjective

## 2018-07-11 NOTE — PROGRESS NOTES
7/11/2018, 1:19 PM    Subjective:    Called by RN to place internal monitors in since difficult to monitor FHT and ctx. SROM at 1230 --clear    Fetal heart tones:  FHT :  140s with early decels  Contractions: q 2 min.   No pitocin    Cervical Exam:  4/C/-1

## 2018-07-11 NOTE — DISCHARGE SUMMARY
[facility] - [facility]    Discharge Summary    Jaspal Chaudhari Patient Status:  Inpatient    1980 MRN [id number]   Location [address]  Attending Yohana Choi MD   [facility] Day # 3       Delivering OB Clinician:  Dr Rush Min

## 2018-07-11 NOTE — H&P
[facility] Patient Status:  Inpatient    1980 MRN [id number]   Location [address]  Attending Chuy Acosta MD   Hosp Day # 0 PCP Kenroy Sanders MD     Date of Admis units SubQ before lunch and inject 4 units SubQ before dinner. ) Disp: 1 vial Rfl: 1 7/10/2018 at 16:45   Insulin NPH, Human,, Isophane, 100 UNIT/ML Subcutaneous Suspension Inject 10 Units into the skin nightly.    Disp:  Rfl:  7/9/2018 at 2300   Prenatal V

## 2018-07-11 NOTE — L&D DELIVERY NOTE
[facility]    Vaginal Delivery Note    Brittney Marcano Patient Status:  Inpatient    1980 MRN [id number]   Location [address]  Attending Kale Avitia MD   Hosp Day # 1 PCP Ganga Gaytan MD       Delivery

## 2018-07-12 LAB
BASOPHILS # BLD: 0 K/UL (ref 0–0.2)
BASOPHILS NFR BLD: 0 %
EOSINOPHIL # BLD: 0.1 K/UL (ref 0–0.7)
EOSINOPHIL NFR BLD: 1 %
ERYTHROCYTE [DISTWIDTH] IN BLOOD BY AUTOMATED COUNT: 15.7 % (ref 11–15)
GLUCOSE BLDC GLUCOMTR-MCNC: 85 MG/DL (ref 70–99)
HCT VFR BLD AUTO: 36.5 % (ref 35–48)
HGB BLD-MCNC: 12 G/DL (ref 12–16)
LYMPHOCYTES # BLD: 2.7 K/UL (ref 1–4)
LYMPHOCYTES NFR BLD: 20 %
MCH RBC QN AUTO: 27.7 PG (ref 27–32)
MCHC RBC AUTO-ENTMCNC: 32.9 G/DL (ref 32–37)
MCV RBC AUTO: 84.4 FL (ref 80–100)
MONOCYTES # BLD: 0.7 K/UL (ref 0–1)
MONOCYTES NFR BLD: 5 %
NEUTROPHILS # BLD AUTO: 10.5 K/UL (ref 1.8–7.7)
NEUTROPHILS NFR BLD: 75 %
PLATELET # BLD AUTO: 228 K/UL (ref 140–400)
PMV BLD AUTO: 9 FL (ref 7.4–10.3)
RBC # BLD AUTO: 4.33 M/UL (ref 3.7–5.4)
WBC # BLD AUTO: 14.1 K/UL (ref 4–11)

## 2018-07-12 NOTE — TELEPHONE ENCOUNTER
Disability form faxed to [employer]. Original mailed to patient. Patient billed. Request complete.

## 2018-07-12 NOTE — PLAN OF CARE
Problem: POSTPARTUM  Goal: Optimize infant feeding at the breast  INTERVENTIONS:  - Initiate breast feeding within first hour after birth. - Monitor effectiveness of current breast feeding efforts. - Assess support systems available to mother/family.   - interruptions  INTERVENTIONS:  - Review techniques for milk expression (breast pumping). - Provide pumping equipment/supplies, instructions, and assistance until it is safe to breastfeed infant.    Outcome: [de-identified]  Mom says baby is only latching for

## 2018-07-12 NOTE — PROGRESS NOTES
[facility]    Post  Progress Note      Amparo Prows Patient Status:  Inpatient    1980 MRN [id number]   Location [facility] [address]SE Attending Rupa Gomez MD   Hosp Day # 2 PCP Samina Brand MD       Subjective     Good

## 2018-07-12 NOTE — PLAN OF CARE
ANXIETY    • Will report anxiety at manageable levels Progressing        PAIN - ADULT    • Verbalizes/displays adequate comfort level or patient's stated pain goal Progressing        Patient Centered Care    • Patient preferences are identified and Danna Jauregui

## 2018-07-13 VITALS
TEMPERATURE: 98 F | SYSTOLIC BLOOD PRESSURE: 117 MMHG | RESPIRATION RATE: 16 BRPM | OXYGEN SATURATION: 97 % | DIASTOLIC BLOOD PRESSURE: 62 MMHG | HEART RATE: 71 BPM

## 2018-07-13 LAB — GLUCOSE BLDC GLUCOMTR-MCNC: 84 MG/DL (ref 70–99)

## 2018-07-13 RX ORDER — PSEUDOEPHEDRINE HCL 30 MG
100 TABLET ORAL 2 TIMES DAILY
Qty: 30 CAPSULE | Refills: 1 | Status: SHIPPED | OUTPATIENT
Start: 2018-07-13 | End: 2021-09-07

## 2018-07-13 RX ORDER — IBUPROFEN 600 MG/1
600 TABLET ORAL EVERY 6 HOURS PRN
Qty: 30 TABLET | Refills: 0 | Status: SHIPPED | OUTPATIENT
Start: 2018-07-13 | End: 2021-09-07

## 2018-07-13 NOTE — LACTATION NOTE
LACTATION NOTE - MOTHER      Evaluation Type: Inpatient    Problems identified  Problems identified: Knowledge deficit    Maternal history  Maternal history: Obesity; [address]; Gestational diabetes  Other/comment: tension headaches    Breastfeeding goal  B
Parents concerned baby was very hungry and mom \"did not have milk\" yet,mom has slightly everted nipples and some latching difficulty. LC encouraged continued STS,watching for earliest feeding cues, feed at breast first before offering bottle. Counseled on
This note was copied from a baby's chart. LACTATION NOTE - INFANT    Evaluation Type  Evaluation Type: Inpatient    Problems & Assessment  Problems Diagnosed or Identified: Latch difficulty; Shallow latch  Infant Assessment: Skin color: pink or appropriate
Attending Only

## 2018-07-13 NOTE — PLAN OF CARE
ANXIETY    • Will report anxiety at manageable levels Progressing        PAIN - ADULT    • Verbalizes/displays adequate comfort level or patient's stated pain goal Progressing        Patient Centered Care    • Patient preferences are identified and Di Willingham

## 2018-07-18 NOTE — TELEPHONE ENCOUNTER
Pt signed release @[facility] 07/17, scanned. CC info provided, but fee was already paid via [employer], shredded.  NK

## 2018-08-22 ENCOUNTER — POSTPARTUM (OUTPATIENT)
Dept: OBGYN CLINIC | Facility: CLINIC | Age: 38
End: 2018-08-22
Payer: COMMERCIAL

## 2018-08-22 VITALS
WEIGHT: 275 LBS | DIASTOLIC BLOOD PRESSURE: 78 MMHG | BODY MASS INDEX: 45 KG/M2 | HEART RATE: 80 BPM | SYSTOLIC BLOOD PRESSURE: 122 MMHG

## 2018-08-24 NOTE — PROGRESS NOTES
HPI    Anita Eli is a 41year old female  here for 6 week post-partum visit. Patient delivered a  male infant on 18 by . Had A2GDM. Patient is bottle feeding.    Patient No symptoms of depression, [place]  depression scale position, mobility, without tenderness  Adnexa: normal without masses or tenderness  Perineum: well healed perineum  Anus: no hemorroids       ASSESSMENT/PLAN  Normal Post partum exam  Discussed various reversible and irreversible contraception.   States sh

## 2018-09-01 ENCOUNTER — TELEPHONE (OUTPATIENT)
Dept: OBGYN CLINIC | Facility: CLINIC | Age: 38
End: 2018-09-01

## 2018-09-01 NOTE — TELEPHONE ENCOUNTER
PT NEEDS FORM TO BE FULLY FILLED OUT AT THE [facility] ON FORM. PLS COMPLETE AND FAX [phone]. PLS CALL PT WHEN ALL DONE AND FAXED.

## 2018-09-04 NOTE — TELEPHONE ENCOUNTER
RWT form for Dr. Charity Hernandez received in [facility]. Logged for processing.  NK

## 2018-09-12 ENCOUNTER — TELEPHONE (OUTPATIENT)
Dept: OBGYN CLINIC | Facility: CLINIC | Age: 38
End: 2018-09-12

## 2018-09-12 NOTE — TELEPHONE ENCOUNTER
At [facility] visit on 8/22/18, pt was given options for [facility]. Pt states JLK told her to do research and decide. Pt would like the 5 year IUD. Pt states her menses started today. Message to Lizzeth Ford 1992 to see if this is ok to book, and if so, will pt need cytotec?   Pt h

## 2018-09-13 NOTE — TELEPHONE ENCOUNTER
Pt informed of JLKs recs below and verbalized understanding. Pt scheduled IUD insertion with [facility] on 9/19. Pt advised to take 600mg ibuprofen 30-60 min prior to insertion.

## 2018-09-19 ENCOUNTER — OFFICE VISIT (OUTPATIENT)
Dept: OBGYN CLINIC | Facility: CLINIC | Age: 38
End: 2018-09-19
Payer: COMMERCIAL

## 2018-09-19 VITALS
DIASTOLIC BLOOD PRESSURE: 82 MMHG | BODY MASS INDEX: 45 KG/M2 | SYSTOLIC BLOOD PRESSURE: 136 MMHG | WEIGHT: 271.81 LBS | HEART RATE: 78 BPM

## 2018-09-19 DIAGNOSIS — Z30.430 ENCOUNTER FOR INSERTION OF INTRAUTERINE CONTRACEPTIVE DEVICE: Primary | ICD-10-CM

## 2018-09-19 PROBLEM — O24.419 GESTATIONAL DIABETES: Status: RESOLVED | Noted: 2018-07-10 | Resolved: 2018-09-19

## 2018-09-19 PROBLEM — O24.419 GESTATIONAL DIABETES (HCC): Status: RESOLVED | Noted: 2018-07-10 | Resolved: 2018-09-19

## 2018-09-19 PROCEDURE — 58300 INSERT INTRAUTERINE DEVICE: CPT | Performed by: OBSTETRICS & GYNECOLOGY

## 2018-09-19 NOTE — PROCEDURES
IUD Insertion     Birth control method(s) used: None. LMP: 9/12/18    Consent signed. Procedure discussed with the patient in detail including indication, risks, benefits, alternatives and complications. Procedure:  Speculum placed in the vagina.   Be

## 2018-09-21 ENCOUNTER — TELEPHONE (OUTPATIENT)
Dept: OBGYN CLINIC | Facility: CLINIC | Age: 38
End: 2018-09-21

## 2018-09-21 NOTE — TELEPHONE ENCOUNTER
Lmtcb. Pt needs to complete 2 hour gtt that was ordered at [facility] visit on 8/22. Lab postponed x 1 month.

## 2018-10-29 NOTE — TELEPHONE ENCOUNTER
Lm to please complete test ordered by [facility] and call office for results. Labs postponed for 1 month.

## 2019-02-17 ENCOUNTER — OFFICE VISIT (OUTPATIENT)
Dept: FAMILY MEDICINE CLINIC | Facility: CLINIC | Age: 39
End: 2019-02-17
Payer: COMMERCIAL

## 2019-02-17 VITALS
BODY MASS INDEX: 43.23 KG/M2 | TEMPERATURE: 98 F | DIASTOLIC BLOOD PRESSURE: 70 MMHG | SYSTOLIC BLOOD PRESSURE: 110 MMHG | RESPIRATION RATE: 16 BRPM | HEIGHT: 66 IN | WEIGHT: 269 LBS | HEART RATE: 98 BPM

## 2019-02-17 DIAGNOSIS — J02.9 PHARYNGITIS, UNSPECIFIED ETIOLOGY: ICD-10-CM

## 2019-02-17 DIAGNOSIS — J06.9 UPPER RESPIRATORY TRACT INFECTION, UNSPECIFIED TYPE: Primary | ICD-10-CM

## 2019-02-17 LAB
CONTROL LINE PRESENT WITH A CLEAR BACKGROUND (YES/NO): PRESENT YES/NO
STREP GRP A CUL-SCR: NEGATIVE

## 2019-02-17 PROCEDURE — 99202 OFFICE O/P NEW SF 15 MIN: CPT | Performed by: NURSE PRACTITIONER

## 2019-02-17 PROCEDURE — 87880 STREP A ASSAY W/OPTIC: CPT | Performed by: NURSE PRACTITIONER

## 2019-02-17 PROCEDURE — 87081 CULTURE SCREEN ONLY: CPT | Performed by: NURSE PRACTITIONER

## 2019-02-17 NOTE — PROGRESS NOTES
CHIEF COMPLAINT:   Patient presents with:  Sore Throat      HPI:   Johnna Bangura is a 41year old female who presents with sore throat for 30 hours. Onset was quick ,   Symptoms are progressing. Location is reported as mid throat.   Description is report MOUTH: AS PER HPI. Patient is able to swallow without difficulty. No secretions, drooling, dysphonia, muffled voice, trismus, or neck swelling. LUNGS: denies shortness of breath, wheezing, or cough. CARDIOVASCULAR:denies chest pain or palpitations.    GI: Onelia Carlton is a 41year old female who presents with     ASSESSMENT: Upper respiratory tract infection, unspecified type  (primary encounter diagnosis)  Pharyngitis, unspecified etiology    POC GABHS Test Negative, culture sent  Pharyngitis likely PND f · If the test is positive for strep, you or your child should not go to work or school for the first 2 days of taking the antibiotics. After this time, you or your child will not be contagious.  You or your child can then return to work or school when Saleem Jain Call your healthcare provider right away if any of these occur:  · Fever as directed by your healthcare provider. For children, seek care if:  ? Your child is of any age and has repeated fevers above 104°F (40°C). ?  Your child is younger than 2 years of a

## 2019-02-19 ENCOUNTER — TELEPHONE (OUTPATIENT)
Dept: FAMILY MEDICINE CLINIC | Facility: CLINIC | Age: 39
End: 2019-02-19

## 2019-02-19 NOTE — TELEPHONE ENCOUNTER
Per FYI/HIPPA on file left WNL lab results on patient's voicemail. Instructed patient to call [phone] with any questions or concerns.

## 2019-03-07 NOTE — TELEPHONE ENCOUNTER
NP positive  pregnancy test LMP 10/12/17
Pt had home positive, LMP; 10/12/17. Pt agree to see all MDs. Pt will start a PNV with DHA. Informed pt to call if she has any problems or spotting/bleeding. Informed pt of her date, time and location for the appt.  Informed pt to arrive 15 minutes jonathan
Additional Notes: Patient was prescribed Clobetasol ointment. He was instructed to apply to the affected area twice daily. If no improvement, patient was instructed to call the office.
Detail Level: Zone

## 2020-05-21 ENCOUNTER — TELEPHONE (OUTPATIENT)
Dept: FAMILY MEDICINE CLINIC | Facility: CLINIC | Age: 40
End: 2020-05-21

## 2020-05-21 ENCOUNTER — PATIENT MESSAGE (OUTPATIENT)
Dept: FAMILY MEDICINE CLINIC | Facility: CLINIC | Age: 40
End: 2020-05-21

## 2020-05-22 ENCOUNTER — TELEMEDICINE (OUTPATIENT)
Dept: FAMILY MEDICINE CLINIC | Facility: CLINIC | Age: 40
End: 2020-05-22

## 2020-05-22 DIAGNOSIS — R68.83 CHILLS: ICD-10-CM

## 2020-05-22 DIAGNOSIS — R51.9 HEADACHE, UNSPECIFIED HEADACHE TYPE: ICD-10-CM

## 2020-05-22 DIAGNOSIS — R05.9 COUGH: Primary | ICD-10-CM

## 2020-05-22 DIAGNOSIS — M79.10 MYALGIA: ICD-10-CM

## 2020-05-22 DIAGNOSIS — R61 NIGHT SWEATS: ICD-10-CM

## 2020-05-22 PROCEDURE — 99213 OFFICE O/P EST LOW 20 MIN: CPT | Performed by: FAMILY MEDICINE

## 2020-05-22 NOTE — PROGRESS NOTES
VIDEO VISIT PROGRESS NOTE  Todays date: 5/22/2020 12:27 PM        Most recent Nurse Triage message / [facility] [id number] message from patient:      José Mackenzie RN   Registered Nurse   Registered Nurse   Telephone Encounter   Signed   Encounter Date:  5/21/2020 language) patient. The patient will give consent but I will be speaking to the person that would be translating.   Also some of these patients with possible COVID-19 infection or some type of other illness are too ill to be on video and would rather have a Guidance  • Fever:      Yes []     No [x]       • Cough      Yes [x]     No []     Dry [x]     Productive []    • Fatigue:    Yes []     No [x]     • Myalgia Yes [x]     No []    • Chills    Yes [x]    No []       • Shortness of breath:  Yes []   No [x] emergency room given.   Headache, unspecified headache type  -     SARS-COV-2 BY PCR; Future    Night sweats  -     SARS-COV-2 BY PCR; Future    Chills  -     SARS-COV-2 BY PCR; Future    Myalgia  -     SARS-COV-2 BY PCR; Future          Follow up if sympto using two-way, real-time interactive audio and/or video communication.   This has been done in good amarjit to provide continuity of care in the best interest of the provider-patient relationship, due to the ongoing public health crisis/national emergency and

## 2020-05-22 NOTE — TELEPHONE ENCOUNTER
Triage please contact patient to further assess. Dr. Dorinda Gallagher or on call please advise.       Subject Delivery           Other  5/21/2020 8:41 PM Reply    To: SAMY Guzman      From: Yady Frye      Created: 5/21/2020 8:41 PM        *-*-*This message has

## 2020-05-22 NOTE — TELEPHONE ENCOUNTER
Would recommend pt make virtual visit tomorrow with a provider who is in office as Dr Irineo Najjar if off tomorrow.

## 2020-05-22 NOTE — TELEPHONE ENCOUNTER
From: Radha Pena  To: Marques Lewis MD  Sent: 5/21/2020 8:41 PM CDT  Subject: Other    I woke up with a migraine with chills, then sweating, and body aches all over. I also woke up with a cough with Phlegm.  I logged in to my chart and took the [facility]

## 2020-05-23 ENCOUNTER — LAB ENCOUNTER (OUTPATIENT)
Dept: LAB | Facility: HOSPITAL | Age: 40
End: 2020-05-23
Attending: FAMILY MEDICINE
Payer: COMMERCIAL

## 2020-05-23 DIAGNOSIS — R61 NIGHT SWEATS: ICD-10-CM

## 2020-05-23 DIAGNOSIS — M79.10 MYALGIA: ICD-10-CM

## 2020-05-23 DIAGNOSIS — R68.83 CHILLS: ICD-10-CM

## 2020-05-23 DIAGNOSIS — R51.9 HEADACHE, UNSPECIFIED HEADACHE TYPE: ICD-10-CM

## 2020-05-23 DIAGNOSIS — R05.9 COUGH: ICD-10-CM

## 2020-06-01 ENCOUNTER — PATIENT MESSAGE (OUTPATIENT)
Dept: FAMILY MEDICINE CLINIC | Facility: CLINIC | Age: 40
End: 2020-06-01

## 2020-06-01 NOTE — TELEPHONE ENCOUNTER
Spoke with patient (name and  verified). Diagnosed with Covid-19 2020. Continues with undiagnosed migraine headaches. Having \"hormonal\" headaches to top of head and around her eyes and above eyebrows. Headaches for almost 2 weeks.  Taking tyleno

## 2020-06-02 ENCOUNTER — VIRTUAL PHONE E/M (OUTPATIENT)
Dept: FAMILY MEDICINE CLINIC | Facility: CLINIC | Age: 40
End: 2020-06-02
Payer: COMMERCIAL

## 2020-06-02 DIAGNOSIS — U07.1 COVID-19: ICD-10-CM

## 2020-06-02 DIAGNOSIS — R51.9 HEADACHE, UNSPECIFIED HEADACHE TYPE: Primary | ICD-10-CM

## 2020-06-02 PROCEDURE — 99213 OFFICE O/P EST LOW 20 MIN: CPT | Performed by: FAMILY MEDICINE

## 2020-06-02 RX ORDER — ACETAMINOPHEN 325 MG/1
325 TABLET ORAL EVERY 6 HOURS PRN
COMMUNITY
End: 2021-09-07

## 2020-06-02 RX ORDER — AMITRIPTYLINE HYDROCHLORIDE 10 MG/1
10 TABLET, FILM COATED ORAL NIGHTLY
Qty: 30 TABLET | Refills: 2 | Status: SHIPPED | OUTPATIENT
Start: 2020-06-02 | End: 2020-09-10

## 2020-06-02 RX ORDER — ONDANSETRON 4 MG/1
4 TABLET, ORALLY DISINTEGRATING ORAL EVERY 8 HOURS PRN
Qty: 20 TABLET | Refills: 0 | Status: SHIPPED | OUTPATIENT
Start: 2020-06-02 | End: 2021-09-07

## 2020-06-02 RX ORDER — ACETAMINOPHEN AND CODEINE PHOSPHATE 300; 30 MG/1; MG/1
1 TABLET ORAL EVERY 6 HOURS PRN
Qty: 30 TABLET | Refills: 0 | Status: SHIPPED | OUTPATIENT
Start: 2020-06-02 | End: 2021-09-07

## 2020-06-02 NOTE — PROGRESS NOTES
Virtual Telephone Check-In    Yady Frye verbally consents to a Virtual/Telephone Check-In visit on 06/02/20. Patient has been referred to the [facility] website at [email or web address] to review the yearly Consent to Treat document.     Patient Bard Sample Smoking status: Never Smoker      Smokeless tobacco: Never Used    Alcohol use: No      Comment: Socially sometimes, a few times a year    Drug use: No       REVIEW OF SYSTEMS:   GENERAL HEALTH: feels well otherwise  SKIN: denies any unusual skin lesions o

## 2020-09-10 RX ORDER — AMITRIPTYLINE HYDROCHLORIDE 10 MG/1
TABLET, FILM COATED ORAL
Qty: 30 TABLET | Refills: 2 | Status: SHIPPED | OUTPATIENT
Start: 2020-09-10 | End: 2020-12-12

## 2020-12-12 RX ORDER — AMITRIPTYLINE HYDROCHLORIDE 10 MG/1
TABLET, FILM COATED ORAL
Qty: 30 TABLET | Refills: 2 | Status: SHIPPED | OUTPATIENT
Start: 2020-12-12 | End: 2021-09-07

## 2021-04-01 DIAGNOSIS — Z23 NEED FOR VACCINATION: ICD-10-CM

## 2021-04-02 ENCOUNTER — IMMUNIZATION (OUTPATIENT)
Dept: LAB | Age: 41
End: 2021-04-02
Attending: HOSPITALIST
Payer: COMMERCIAL

## 2021-04-02 DIAGNOSIS — Z23 NEED FOR VACCINATION: Primary | ICD-10-CM

## 2021-04-02 PROCEDURE — 0001A SARSCOV2 VAC 30MCG/0.3ML IM: CPT

## 2021-04-24 ENCOUNTER — IMMUNIZATION (OUTPATIENT)
Dept: LAB | Age: 41
End: 2021-04-24
Attending: HOSPITALIST
Payer: COMMERCIAL

## 2021-04-24 DIAGNOSIS — Z23 NEED FOR VACCINATION: Primary | ICD-10-CM

## 2021-04-24 PROCEDURE — 0002A SARSCOV2 VAC 30MCG/0.3ML IM: CPT

## 2021-06-03 NOTE — TELEPHONE ENCOUNTER
06/02/21 1100   Daily Treatment   Symptoms Review Activity Group Individual   Affect Anxious   Mood Anxious   Thought Process Narrow   Psychosis None   Symptom Notation Pt expressed feeling \"content\" today.    Psychosocial Stressors Interpersonal conflict;Loss / Grief   Sleep Report Difficulty falling asleep   Hours Slept 7   Meals Dinner   Goal Complete / Activity Pt's first day; will set goals.    Treatment Plan New treatment plan (see ITP)   Patient Response Appropriate feedback;Attentive;Good eye contact   Comment Pt completed intake paperwork.   RN Monitoring of Pain, Safety, and Relapse   Safety Concerns None   Physical Concerns 7/10   Pain Concerns 7/10   Use of Street Drugs or Alcohol No   Taking Medications as Prescribed Yes   Patient Response Appropriate feedback;Attentive;Verbal   Nursing Comments First day of PHP see progress notes   Val Salmeron, APSW    CAP REVIEWED BLOOD SUGARS AND INCREASED BEDTIME INSULIN TO 6 UNITS OF NPH. NEW INSULIN ORDER IS 0 + 2 +2 +6N. PT NOTIFIED OF NEW INSULIN ORDER AND TO SEND SUGARS AGAIN IN 3-4 DAYS. SHE IS ALSO AWARE I CONTACTED THE PHARMACY FOR HER REFILLS.

## 2021-09-07 ENCOUNTER — LAB ENCOUNTER (OUTPATIENT)
Dept: LAB | Age: 41
End: 2021-09-07
Attending: FAMILY MEDICINE
Payer: COMMERCIAL

## 2021-09-07 ENCOUNTER — EKG ENCOUNTER (OUTPATIENT)
Dept: LAB | Age: 41
End: 2021-09-07
Attending: FAMILY MEDICINE
Payer: COMMERCIAL

## 2021-09-07 DIAGNOSIS — R00.2 PALPITATIONS: ICD-10-CM

## 2021-09-07 DIAGNOSIS — Z86.32 HISTORY OF GESTATIONAL DIABETES: ICD-10-CM

## 2021-09-07 DIAGNOSIS — R07.9 CHEST PAIN, UNSPECIFIED TYPE: ICD-10-CM

## 2021-09-07 DIAGNOSIS — F41.9 ANXIETY: ICD-10-CM

## 2021-09-07 LAB
ALBUMIN SERPL-MCNC: 3.8 G/DL (ref 3.4–5)
ALBUMIN/GLOB SERPL: 1 {RATIO} (ref 1–2)
ALP LIVER SERPL-CCNC: 99 U/L
ALT SERPL-CCNC: 37 U/L
ANION GAP SERPL CALC-SCNC: 6 MMOL/L (ref 0–18)
AST SERPL-CCNC: 13 U/L (ref 15–37)
BASOPHILS # BLD AUTO: 0.08 X10(3) UL (ref 0–0.2)
BASOPHILS NFR BLD AUTO: 0.7 %
BILIRUB SERPL-MCNC: 0.6 MG/DL (ref 0.1–2)
BUN BLD-MCNC: 19 MG/DL (ref 7–18)
BUN/CREAT SERPL: 32.2 (ref 10–20)
CALCIUM BLD-MCNC: 9 MG/DL (ref 8.5–10.1)
CHLORIDE SERPL-SCNC: 108 MMOL/L (ref 98–112)
CO2 SERPL-SCNC: 26 MMOL/L (ref 21–32)
CREAT BLD-MCNC: 0.59 MG/DL
DEPRECATED RDW RBC AUTO: 44.6 FL (ref 35.1–46.3)
EOSINOPHIL # BLD AUTO: 0.24 X10(3) UL (ref 0–0.7)
EOSINOPHIL NFR BLD AUTO: 2.1 %
ERYTHROCYTE [DISTWIDTH] IN BLOOD BY AUTOMATED COUNT: 14.3 % (ref 11–15)
EST. AVERAGE GLUCOSE BLD GHB EST-MCNC: 117 MG/DL (ref 68–126)
GLOBULIN PLAS-MCNC: 3.7 G/DL (ref 2.8–4.4)
GLUCOSE BLD-MCNC: 94 MG/DL (ref 70–99)
HBA1C MFR BLD HPLC: 5.7 % (ref ?–5.7)
HCT VFR BLD AUTO: 43.1 %
HGB BLD-MCNC: 13.7 G/DL
IMM GRANULOCYTES # BLD AUTO: 0.03 X10(3) UL (ref 0–1)
IMM GRANULOCYTES NFR BLD: 0.3 %
LYMPHOCYTES # BLD AUTO: 2.71 X10(3) UL (ref 1–4)
LYMPHOCYTES NFR BLD AUTO: 23.2 %
M PROTEIN MFR SERPL ELPH: 7.5 G/DL (ref 6.4–8.2)
MCH RBC QN AUTO: 27.2 PG (ref 26–34)
MCHC RBC AUTO-ENTMCNC: 31.8 G/DL (ref 31–37)
MCV RBC AUTO: 85.5 FL
MONOCYTES # BLD AUTO: 0.6 X10(3) UL (ref 0.1–1)
MONOCYTES NFR BLD AUTO: 5.1 %
NEUTROPHILS # BLD AUTO: 8.02 X10 (3) UL (ref 1.5–7.7)
NEUTROPHILS # BLD AUTO: 8.02 X10(3) UL (ref 1.5–7.7)
NEUTROPHILS NFR BLD AUTO: 68.6 %
OSMOLALITY SERPL CALC.SUM OF ELEC: 292 MOSM/KG (ref 275–295)
PATIENT FASTING Y/N/NP: NO
PLATELET # BLD AUTO: 341 10(3)UL (ref 150–450)
POTASSIUM SERPL-SCNC: 4.2 MMOL/L (ref 3.5–5.1)
RBC # BLD AUTO: 5.04 X10(6)UL
SODIUM SERPL-SCNC: 140 MMOL/L (ref 136–145)
TSI SER-ACNC: 1.16 MIU/ML (ref 0.36–3.74)
VIT B12 SERPL-MCNC: 807 PG/ML (ref 193–986)
VIT D+METAB SERPL-MCNC: 24.6 NG/ML (ref 30–100)
WBC # BLD AUTO: 11.7 X10(3) UL (ref 4–11)

## 2021-09-07 PROCEDURE — 84443 ASSAY THYROID STIM HORMONE: CPT

## 2021-09-07 PROCEDURE — 83036 HEMOGLOBIN GLYCOSYLATED A1C: CPT

## 2021-09-07 PROCEDURE — 93010 ELECTROCARDIOGRAM REPORT: CPT | Performed by: FAMILY MEDICINE

## 2021-09-07 PROCEDURE — 93005 ELECTROCARDIOGRAM TRACING: CPT

## 2021-09-07 PROCEDURE — 36415 COLL VENOUS BLD VENIPUNCTURE: CPT

## 2021-09-07 PROCEDURE — 82607 VITAMIN B-12: CPT

## 2021-09-07 PROCEDURE — 85025 COMPLETE CBC W/AUTO DIFF WBC: CPT

## 2021-09-07 PROCEDURE — 82306 VITAMIN D 25 HYDROXY: CPT

## 2021-09-07 PROCEDURE — 80053 COMPREHEN METABOLIC PANEL: CPT

## 2021-09-07 NOTE — PROGRESS NOTES
Jaspal Chaudhari is a 46year old female. Patient presents with:  Palpitations: x 1-2 months  Shortness Of Breath: x 1-2 months    HPI:   Pt reports 1-2 months with shortness of breath and nervousness. Reports anxiety about certain things.  Reports the nervous cough  CARDIOVASCULAR: pos chest pain  GI: denies abdominal pain and denies heartburn  NEURO: pos headaches  Musculoskeletal: no joint pain, back pain    EXAM:   /83   Pulse 67   Temp 96.9 °F (36.1 °C) (Temporal)   Ht 5' 6\" (1.676 m)   Wt (!) 300 lb

## 2021-09-14 NOTE — PROGRESS NOTES
HEAVEN Devine - Overall your labs look normal. Your vitamin d levels are mildly decreased so you can take extra vitamin D 2000 units daily. Glucose, kidney and liver function are all normal. You are in the prediabetes range but you do not have diabetes.  Focus o

## 2022-01-22 ENCOUNTER — IMMUNIZATION (OUTPATIENT)
Dept: LAB | Facility: HOSPITAL | Age: 42
End: 2022-01-22
Attending: EMERGENCY MEDICINE
Payer: COMMERCIAL

## 2022-01-22 DIAGNOSIS — Z23 NEED FOR VACCINATION: Primary | ICD-10-CM

## 2022-01-22 PROCEDURE — 0004A SARSCOV2 VAC 30MCG/0.3ML IM: CPT

## 2022-04-27 NOTE — TELEPHONE ENCOUNTER
No need for pt to check BP unless specifically requested by us.  We will check at next office visit Finasteride Pregnancy And Lactation Text: This medication is absolutely contraindicated during pregnancy. It is unknown if it is excreted in breast milk.

## 2022-05-07 ENCOUNTER — HOSPITAL ENCOUNTER (OUTPATIENT)
Dept: MAMMOGRAPHY | Age: 42
Discharge: HOME | End: 2022-05-07
Attending: FAMILY MEDICINE
Payer: COMMERCIAL

## 2022-05-07 ENCOUNTER — PATIENT MESSAGE (OUTPATIENT)
Dept: FAMILY MEDICINE CLINIC | Facility: CLINIC | Age: 42
End: 2022-05-07

## 2022-05-07 DIAGNOSIS — Z12.39 ENCOUNTER FOR SCREENING FOR MALIGNANT NEOPLASM OF BREAST: ICD-10-CM

## 2022-05-07 DIAGNOSIS — Z12.31 ENCOUNTER FOR SCREENING MAMMOGRAM FOR MALIGNANT NEOPLASM OF BREAST: ICD-10-CM

## 2022-05-07 PROCEDURE — 77063 BREAST TOMOSYNTHESIS BI: CPT | Performed by: FAMILY MEDICINE

## 2022-05-07 PROCEDURE — 77067 SCR MAMMO BI INCL CAD: CPT | Performed by: FAMILY MEDICINE

## 2022-05-07 NOTE — TELEPHONE ENCOUNTER
From: Luciano Flores  To: Galilea Handley MD  Sent: 5/7/2022 12:07 PM CDT  Subject: Mammogram     Good morning - I didn't know I needed an order to schedule a mammogram until they called me this morning before my appointment. I made the appointment due to turning 38 and I received a couple of emails to schedule and I did. Hopefully I didn't do anything wrong or hopefully my insurance doesn't deny the procedure but I had to self certify I believe. Hopefully that ok.

## 2022-09-06 ENCOUNTER — OFFICE VISIT (OUTPATIENT)
Dept: FAMILY MEDICINE CLINIC | Facility: CLINIC | Age: 42
End: 2022-09-06
Payer: COMMERCIAL

## 2022-09-06 VITALS
DIASTOLIC BLOOD PRESSURE: 86 MMHG | BODY MASS INDEX: 47.09 KG/M2 | HEART RATE: 86 BPM | SYSTOLIC BLOOD PRESSURE: 150 MMHG | TEMPERATURE: 98 F | HEIGHT: 66 IN | WEIGHT: 293 LBS

## 2022-09-06 DIAGNOSIS — M54.50 ACUTE RIGHT-SIDED LOW BACK PAIN WITHOUT SCIATICA: ICD-10-CM

## 2022-09-06 DIAGNOSIS — F41.9 ANXIETY: ICD-10-CM

## 2022-09-06 DIAGNOSIS — R73.03 PRE-DIABETES: ICD-10-CM

## 2022-09-06 PROCEDURE — 99214 OFFICE O/P EST MOD 30 MIN: CPT | Performed by: FAMILY MEDICINE

## 2022-09-06 RX ORDER — PEN NEEDLE, DIABETIC 30 GX3/16"
1 NEEDLE, DISPOSABLE MISCELLANEOUS
Qty: 30 EACH | Refills: 0 | Status: SHIPPED | OUTPATIENT
Start: 2022-09-06 | End: 2022-09-12

## 2022-09-06 RX ORDER — SEMAGLUTIDE 1.34 MG/ML
0.25 INJECTION, SOLUTION SUBCUTANEOUS
Qty: 1 PEN | Refills: 0 | Status: SHIPPED | OUTPATIENT
Start: 2022-09-06 | End: 2022-09-12

## 2022-09-06 RX ORDER — RIMEGEPANT SULFATE 75 MG/75MG
75 TABLET, ORALLY DISINTEGRATING ORAL ONCE AS NEEDED
Qty: 30 TABLET | Refills: 0 | Status: SHIPPED | OUTPATIENT
Start: 2022-09-06 | End: 2022-09-06

## 2022-09-07 ENCOUNTER — TELEPHONE (OUTPATIENT)
Dept: FAMILY MEDICINE CLINIC | Facility: CLINIC | Age: 42
End: 2022-09-07

## 2022-09-10 ENCOUNTER — LAB ENCOUNTER (OUTPATIENT)
Dept: LAB | Age: 42
End: 2022-09-10
Attending: FAMILY MEDICINE
Payer: COMMERCIAL

## 2022-09-10 DIAGNOSIS — F41.9 ANXIETY: ICD-10-CM

## 2022-09-10 DIAGNOSIS — R73.03 PRE-DIABETES: ICD-10-CM

## 2022-09-10 LAB
ALBUMIN SERPL-MCNC: 3.7 G/DL (ref 3.4–5)
ALBUMIN/GLOB SERPL: 1 {RATIO} (ref 1–2)
ALP LIVER SERPL-CCNC: 98 U/L
ALT SERPL-CCNC: 36 U/L
ANION GAP SERPL CALC-SCNC: 9 MMOL/L (ref 0–18)
AST SERPL-CCNC: 11 U/L (ref 15–37)
BASOPHILS # BLD AUTO: 0.08 X10(3) UL (ref 0–0.2)
BASOPHILS NFR BLD AUTO: 0.6 %
BILIRUB SERPL-MCNC: 1.1 MG/DL (ref 0.1–2)
BUN BLD-MCNC: 13 MG/DL (ref 7–18)
BUN/CREAT SERPL: 21.7 (ref 10–20)
CALCIUM BLD-MCNC: 8.9 MG/DL (ref 8.5–10.1)
CHLORIDE SERPL-SCNC: 106 MMOL/L (ref 98–112)
CHOLEST SERPL-MCNC: 176 MG/DL (ref ?–200)
CO2 SERPL-SCNC: 25 MMOL/L (ref 21–32)
CREAT BLD-MCNC: 0.6 MG/DL
DEPRECATED RDW RBC AUTO: 43.8 FL (ref 35.1–46.3)
EOSINOPHIL # BLD AUTO: 0.13 X10(3) UL (ref 0–0.7)
EOSINOPHIL NFR BLD AUTO: 1 %
ERYTHROCYTE [DISTWIDTH] IN BLOOD BY AUTOMATED COUNT: 14.1 % (ref 11–15)
EST. AVERAGE GLUCOSE BLD GHB EST-MCNC: 117 MG/DL (ref 68–126)
FASTING PATIENT LIPID ANSWER: YES
FASTING STATUS PATIENT QL REPORTED: YES
GFR SERPLBLD BASED ON 1.73 SQ M-ARVRAT: 116 ML/MIN/1.73M2 (ref 60–?)
GLOBULIN PLAS-MCNC: 3.6 G/DL (ref 2.8–4.4)
GLUCOSE BLD-MCNC: 95 MG/DL (ref 70–99)
HBA1C MFR BLD: 5.7 % (ref ?–5.7)
HCT VFR BLD AUTO: 44 %
HDLC SERPL-MCNC: 31 MG/DL (ref 40–59)
HGB BLD-MCNC: 14.4 G/DL
IMM GRANULOCYTES # BLD AUTO: 0.05 X10(3) UL (ref 0–1)
IMM GRANULOCYTES NFR BLD: 0.4 %
INSULIN SERPL-ACNC: 13.2 MU/L (ref 3–25)
LDLC SERPL CALC-MCNC: 117 MG/DL (ref ?–100)
LYMPHOCYTES # BLD AUTO: 3.61 X10(3) UL (ref 1–4)
LYMPHOCYTES NFR BLD AUTO: 28.3 %
MCH RBC QN AUTO: 27.9 PG (ref 26–34)
MCHC RBC AUTO-ENTMCNC: 32.7 G/DL (ref 31–37)
MCV RBC AUTO: 85.1 FL
MONOCYTES # BLD AUTO: 0.71 X10(3) UL (ref 0.1–1)
MONOCYTES NFR BLD AUTO: 5.6 %
NEUTROPHILS # BLD AUTO: 8.18 X10 (3) UL (ref 1.5–7.7)
NEUTROPHILS # BLD AUTO: 8.18 X10(3) UL (ref 1.5–7.7)
NEUTROPHILS NFR BLD AUTO: 64.1 %
NONHDLC SERPL-MCNC: 145 MG/DL (ref ?–130)
OSMOLALITY SERPL CALC.SUM OF ELEC: 290 MOSM/KG (ref 275–295)
PLATELET # BLD AUTO: 357 10(3)UL (ref 150–450)
POTASSIUM SERPL-SCNC: 4 MMOL/L (ref 3.5–5.1)
PROT SERPL-MCNC: 7.3 G/DL (ref 6.4–8.2)
RBC # BLD AUTO: 5.17 X10(6)UL
SODIUM SERPL-SCNC: 140 MMOL/L (ref 136–145)
TRIGL SERPL-MCNC: 156 MG/DL (ref 30–149)
TSI SER-ACNC: 1.43 MIU/ML (ref 0.36–3.74)
VIT B12 SERPL-MCNC: 789 PG/ML (ref 193–986)
VIT D+METAB SERPL-MCNC: 25 NG/ML (ref 30–100)
VLDLC SERPL CALC-MCNC: 27 MG/DL (ref 0–30)
WBC # BLD AUTO: 12.8 X10(3) UL (ref 4–11)

## 2022-09-10 PROCEDURE — 80061 LIPID PANEL: CPT

## 2022-09-10 PROCEDURE — 83036 HEMOGLOBIN GLYCOSYLATED A1C: CPT

## 2022-09-10 PROCEDURE — 80053 COMPREHEN METABOLIC PANEL: CPT

## 2022-09-10 PROCEDURE — 84443 ASSAY THYROID STIM HORMONE: CPT

## 2022-09-10 PROCEDURE — 36415 COLL VENOUS BLD VENIPUNCTURE: CPT

## 2022-09-10 PROCEDURE — 83525 ASSAY OF INSULIN: CPT

## 2022-09-10 PROCEDURE — 85025 COMPLETE CBC W/AUTO DIFF WBC: CPT

## 2022-09-10 PROCEDURE — 82306 VITAMIN D 25 HYDROXY: CPT

## 2022-09-10 PROCEDURE — 82607 VITAMIN B-12: CPT

## 2022-09-12 RX ORDER — METFORMIN HYDROCHLORIDE 500 MG/1
500 TABLET, EXTENDED RELEASE ORAL 2 TIMES DAILY WITH MEALS
Qty: 60 TABLET | Refills: 2 | Status: SHIPPED | OUTPATIENT
Start: 2022-09-12

## 2022-09-12 NOTE — TELEPHONE ENCOUNTER
Can let pt know that insurance will not approve medication because not diabetic - only insulin resistance right now. I will send metformin to help with that - start once a day in am for week then twice a day.

## 2022-09-12 NOTE — TELEPHONE ENCOUNTER
Patient completed labs on 09/10/2022. A1C is 5.7. She does not meet criteria on prior authorization. Please advise.

## 2022-09-15 ENCOUNTER — TELEPHONE (OUTPATIENT)
Dept: FAMILY MEDICINE CLINIC | Facility: CLINIC | Age: 42
End: 2022-09-15

## 2022-09-15 NOTE — PROGRESS NOTES
Glucose, kidney and liver function were all normal. Cholesterol was a bit better so good job - keep it going.  Your vitamin D levels were a bit low - Please take extra vitamin D 2000 units daily. - Dr. Maribell Moffett

## 2022-09-15 NOTE — TELEPHONE ENCOUNTER
Rx: Nurtec 75 MG Dispersible Tablets    Pharmacy Message: A prior authorization has been started for you for the above medication. Here is all you need to do to submit the PA:    1. Open- CoverMyMeds using [email or web address] and enter your credentials to login. 2. Click- \"Enter a Key\"    3. Enter-       Zee: Rosa Tyler      Patient's Last Name: Georgina Solis      Patient's YOB: 1980    4. Complete- the request and click \"Send to Plan\", call the plan, or mail the plan where appropriate.

## 2022-09-16 RX ORDER — RIMEGEPANT SULFATE 75 MG/75MG
75 TABLET, ORALLY DISINTEGRATING ORAL ONCE AS NEEDED
Qty: 30 TABLET | Refills: 0 | Status: SHIPPED | OUTPATIENT
Start: 2022-09-16 | End: 2022-09-22

## 2022-09-19 ENCOUNTER — TELEPHONE (OUTPATIENT)
Dept: FAMILY MEDICINE CLINIC | Facility: CLINIC | Age: 42
End: 2022-09-19

## 2022-09-19 NOTE — TELEPHONE ENCOUNTER
Rx: [address] Message: A prior authorization has been started for this patient for the above medication by the pharmacy. To submit the PA, please follow the instructions below:    1. Login to [email or web address] [email or web address] and click \"Enter a Key\"    2. Enter the patient's last name, date of birth, and the Key provided below    [facility]  Patient Last Name: Vin Blackwell  : 1980    3. Complete the PA and click \"Send to Plan\" for approval    Please notify the pharmacy when you receive a determination form the plan.     Sincerely,  Eleazar #[id number]

## 2022-09-22 RX ORDER — RIZATRIPTAN BENZOATE 10 MG/1
10 TABLET, ORALLY DISINTEGRATING ORAL AS NEEDED
Qty: 10 TABLET | Refills: 1 | Status: SHIPPED | OUTPATIENT
Start: 2022-09-22 | End: 2023-09-22

## 2022-10-12 ENCOUNTER — OFFICE VISIT (OUTPATIENT)
Dept: FAMILY MEDICINE CLINIC | Facility: CLINIC | Age: 42
End: 2022-10-12
Payer: COMMERCIAL

## 2022-10-12 VITALS
DIASTOLIC BLOOD PRESSURE: 86 MMHG | BODY MASS INDEX: 47.06 KG/M2 | HEART RATE: 71 BPM | TEMPERATURE: 97 F | WEIGHT: 292.81 LBS | SYSTOLIC BLOOD PRESSURE: 135 MMHG | HEIGHT: 66 IN

## 2022-10-12 DIAGNOSIS — Z00.00 ROUTINE MEDICAL EXAM: Primary | ICD-10-CM

## 2022-10-12 DIAGNOSIS — F41.9 ANXIETY: ICD-10-CM

## 2022-10-12 PROCEDURE — 99396 PREV VISIT EST AGE 40-64: CPT | Performed by: FAMILY MEDICINE

## 2022-10-12 PROCEDURE — 3075F SYST BP GE 130 - 139MM HG: CPT | Performed by: FAMILY MEDICINE

## 2022-10-12 PROCEDURE — 3008F BODY MASS INDEX DOCD: CPT | Performed by: FAMILY MEDICINE

## 2022-10-12 PROCEDURE — 90686 IIV4 VACC NO PRSV 0.5 ML IM: CPT | Performed by: FAMILY MEDICINE

## 2022-10-12 PROCEDURE — 90471 IMMUNIZATION ADMIN: CPT | Performed by: FAMILY MEDICINE

## 2022-10-12 PROCEDURE — 3079F DIAST BP 80-89 MM HG: CPT | Performed by: FAMILY MEDICINE

## 2022-10-12 RX ORDER — METFORMIN HYDROCHLORIDE 500 MG/1
500 TABLET, EXTENDED RELEASE ORAL 2 TIMES DAILY WITH MEALS
Qty: 180 TABLET | Refills: 2 | Status: SHIPPED | OUTPATIENT
Start: 2022-10-12

## 2023-01-12 ENCOUNTER — OFFICE VISIT (OUTPATIENT)
Dept: FAMILY MEDICINE CLINIC | Facility: CLINIC | Age: 43
End: 2023-01-12

## 2023-01-12 VITALS
HEIGHT: 66 IN | BODY MASS INDEX: 40.88 KG/M2 | WEIGHT: 254.38 LBS | DIASTOLIC BLOOD PRESSURE: 85 MMHG | SYSTOLIC BLOOD PRESSURE: 142 MMHG | HEART RATE: 69 BPM

## 2023-01-12 DIAGNOSIS — Z01.419 ENCOUNTER FOR WELL WOMAN EXAM WITH ROUTINE GYNECOLOGICAL EXAM: Primary | ICD-10-CM

## 2023-01-12 DIAGNOSIS — E55.9 VITAMIN D DEFICIENCY: ICD-10-CM

## 2023-01-12 DIAGNOSIS — Z12.31 SCREENING MAMMOGRAM FOR BREAST CANCER: ICD-10-CM

## 2023-01-12 PROCEDURE — 3079F DIAST BP 80-89 MM HG: CPT | Performed by: FAMILY MEDICINE

## 2023-01-12 PROCEDURE — 99396 PREV VISIT EST AGE 40-64: CPT | Performed by: FAMILY MEDICINE

## 2023-01-12 PROCEDURE — 3008F BODY MASS INDEX DOCD: CPT | Performed by: FAMILY MEDICINE

## 2023-01-12 PROCEDURE — 3077F SYST BP >= 140 MM HG: CPT | Performed by: FAMILY MEDICINE

## 2023-01-12 RX ORDER — RIZATRIPTAN BENZOATE 10 MG/1
10 TABLET, ORALLY DISINTEGRATING ORAL AS NEEDED
Qty: 27 TABLET | Refills: 1 | Status: SHIPPED | OUTPATIENT
Start: 2023-01-12 | End: 2023-04-12

## 2023-01-13 LAB — HPV I/H RISK 1 DNA SPEC QL NAA+PROBE: NEGATIVE

## 2023-01-14 ENCOUNTER — LAB ENCOUNTER (OUTPATIENT)
Dept: LAB | Age: 43
End: 2023-01-14
Attending: FAMILY MEDICINE
Payer: COMMERCIAL

## 2023-01-14 DIAGNOSIS — E55.9 VITAMIN D DEFICIENCY: ICD-10-CM

## 2023-01-14 DIAGNOSIS — Z01.419 ENCOUNTER FOR WELL WOMAN EXAM WITH ROUTINE GYNECOLOGICAL EXAM: ICD-10-CM

## 2023-01-14 LAB
ALBUMIN SERPL-MCNC: 3.7 G/DL (ref 3.4–5)
ALBUMIN/GLOB SERPL: 1.2 {RATIO} (ref 1–2)
ALP LIVER SERPL-CCNC: 97 U/L
ALT SERPL-CCNC: 29 U/L
ANION GAP SERPL CALC-SCNC: 6 MMOL/L (ref 0–18)
AST SERPL-CCNC: 11 U/L (ref 15–37)
BASOPHILS # BLD AUTO: 0.07 X10(3) UL (ref 0–0.2)
BASOPHILS NFR BLD AUTO: 0.7 %
BILIRUB SERPL-MCNC: 0.9 MG/DL (ref 0.1–2)
BUN BLD-MCNC: 12 MG/DL (ref 7–18)
BUN/CREAT SERPL: 21.8 (ref 10–20)
CALCIUM BLD-MCNC: 9.2 MG/DL (ref 8.5–10.1)
CHLORIDE SERPL-SCNC: 108 MMOL/L (ref 98–112)
CHOLEST SERPL-MCNC: 186 MG/DL (ref ?–200)
CO2 SERPL-SCNC: 27 MMOL/L (ref 21–32)
CREAT BLD-MCNC: 0.55 MG/DL
DEPRECATED RDW RBC AUTO: 48.3 FL (ref 35.1–46.3)
EOSINOPHIL # BLD AUTO: 0.13 X10(3) UL (ref 0–0.7)
EOSINOPHIL NFR BLD AUTO: 1.3 %
ERYTHROCYTE [DISTWIDTH] IN BLOOD BY AUTOMATED COUNT: 14.8 % (ref 11–15)
EST. AVERAGE GLUCOSE BLD GHB EST-MCNC: 103 MG/DL (ref 68–126)
FASTING PATIENT LIPID ANSWER: YES
FASTING STATUS PATIENT QL REPORTED: YES
GFR SERPLBLD BASED ON 1.73 SQ M-ARVRAT: 119 ML/MIN/1.73M2 (ref 60–?)
GLOBULIN PLAS-MCNC: 3 G/DL (ref 2.8–4.4)
GLUCOSE BLD-MCNC: 88 MG/DL (ref 70–99)
HBA1C MFR BLD: 5.2 % (ref ?–5.7)
HCT VFR BLD AUTO: 42 %
HDLC SERPL-MCNC: 35 MG/DL (ref 40–59)
HGB BLD-MCNC: 13.4 G/DL
IMM GRANULOCYTES # BLD AUTO: 0.03 X10(3) UL (ref 0–1)
IMM GRANULOCYTES NFR BLD: 0.3 %
INSULIN SERPL-ACNC: 5.3 MU/L (ref 3–25)
LDLC SERPL CALC-MCNC: 128 MG/DL (ref ?–100)
LYMPHOCYTES # BLD AUTO: 3.12 X10(3) UL (ref 1–4)
LYMPHOCYTES NFR BLD AUTO: 31.9 %
MCH RBC QN AUTO: 28.3 PG (ref 26–34)
MCHC RBC AUTO-ENTMCNC: 31.9 G/DL (ref 31–37)
MCV RBC AUTO: 88.8 FL
MONOCYTES # BLD AUTO: 0.51 X10(3) UL (ref 0.1–1)
MONOCYTES NFR BLD AUTO: 5.2 %
NEUTROPHILS # BLD AUTO: 5.92 X10 (3) UL (ref 1.5–7.7)
NEUTROPHILS # BLD AUTO: 5.92 X10(3) UL (ref 1.5–7.7)
NEUTROPHILS NFR BLD AUTO: 60.6 %
NONHDLC SERPL-MCNC: 151 MG/DL (ref ?–130)
OSMOLALITY SERPL CALC.SUM OF ELEC: 291 MOSM/KG (ref 275–295)
PLATELET # BLD AUTO: 330 10(3)UL (ref 150–450)
POTASSIUM SERPL-SCNC: 3.7 MMOL/L (ref 3.5–5.1)
PROT SERPL-MCNC: 6.7 G/DL (ref 6.4–8.2)
RBC # BLD AUTO: 4.73 X10(6)UL
SODIUM SERPL-SCNC: 141 MMOL/L (ref 136–145)
TRIGL SERPL-MCNC: 125 MG/DL (ref 30–149)
TSI SER-ACNC: 1.12 MIU/ML (ref 0.36–3.74)
VIT B12 SERPL-MCNC: 684 PG/ML (ref 193–986)
VIT D+METAB SERPL-MCNC: 29.5 NG/ML (ref 30–100)
VLDLC SERPL CALC-MCNC: 22 MG/DL (ref 0–30)
WBC # BLD AUTO: 9.8 X10(3) UL (ref 4–11)

## 2023-01-14 PROCEDURE — 85025 COMPLETE CBC W/AUTO DIFF WBC: CPT

## 2023-01-14 PROCEDURE — 83036 HEMOGLOBIN GLYCOSYLATED A1C: CPT

## 2023-01-14 PROCEDURE — 82306 VITAMIN D 25 HYDROXY: CPT

## 2023-01-14 PROCEDURE — 83525 ASSAY OF INSULIN: CPT

## 2023-01-14 PROCEDURE — 82607 VITAMIN B-12: CPT

## 2023-01-14 PROCEDURE — 80053 COMPREHEN METABOLIC PANEL: CPT

## 2023-01-14 PROCEDURE — 36415 COLL VENOUS BLD VENIPUNCTURE: CPT

## 2023-01-14 PROCEDURE — 84443 ASSAY THYROID STIM HORMONE: CPT

## 2023-01-14 PROCEDURE — 80061 LIPID PANEL: CPT

## 2023-01-18 NOTE — PROGRESS NOTES
Albin Fong - Your labs look good overall. Still mildly elevated cholesterol so continue to work on improved diet and regular exercise.  Vitamin D levels are mildly decreased - please take extra over the counter vitamin D 2000 units daily. - Dr. Citlalli Gonzalez

## 2023-05-30 ENCOUNTER — HOSPITAL ENCOUNTER (OUTPATIENT)
Dept: MAMMOGRAPHY | Age: 43
Discharge: HOME | End: 2023-05-30
Attending: FAMILY MEDICINE
Payer: COMMERCIAL

## 2023-05-30 DIAGNOSIS — Z12.31 SCREENING MAMMOGRAM FOR BREAST CANCER: ICD-10-CM

## 2023-05-30 PROCEDURE — 77063 BREAST TOMOSYNTHESIS BI: CPT | Performed by: FAMILY MEDICINE

## 2023-05-30 PROCEDURE — 77067 SCR MAMMO BI INCL CAD: CPT | Performed by: FAMILY MEDICINE

## 2023-09-04 ENCOUNTER — OFFICE VISIT (OUTPATIENT)
Dept: FAMILY MEDICINE CLINIC | Facility: CLINIC | Age: 43
End: 2023-09-04
Payer: COMMERCIAL

## 2023-09-04 VITALS
HEART RATE: 71 BPM | HEIGHT: 66 IN | SYSTOLIC BLOOD PRESSURE: 118 MMHG | BODY MASS INDEX: 39.37 KG/M2 | TEMPERATURE: 98 F | RESPIRATION RATE: 18 BRPM | DIASTOLIC BLOOD PRESSURE: 82 MMHG | WEIGHT: 245 LBS | OXYGEN SATURATION: 97 %

## 2023-09-04 DIAGNOSIS — J06.9 VIRAL URI: Primary | ICD-10-CM

## 2023-09-04 LAB
CONTROL LINE PRESENT WITH A CLEAR BACKGROUND (YES/NO): YES YES/NO
KIT LOT #: NORMAL NUMERIC
STREP GRP A CUL-SCR: NEGATIVE

## 2023-09-04 PROCEDURE — 87635 SARS-COV-2 COVID-19 AMP PRB: CPT | Performed by: NURSE PRACTITIONER

## 2023-09-04 RX ORDER — RIZATRIPTAN BENZOATE 10 MG/1
10 TABLET, ORALLY DISINTEGRATING ORAL AS NEEDED
COMMUNITY
Start: 2023-07-31

## 2023-09-05 LAB — SARS-COV-2 RNA RESP QL NAA+PROBE: NOT DETECTED

## 2023-09-22 RX ORDER — RIZATRIPTAN BENZOATE 10 MG/1
10 TABLET, ORALLY DISINTEGRATING ORAL AS NEEDED
Qty: 27 TABLET | Refills: 1 | Status: SHIPPED | OUTPATIENT
Start: 2023-09-22

## 2023-09-22 NOTE — TELEPHONE ENCOUNTER
Refill passed per [employer], [facility] protocol.     Requested Prescriptions   Pending Prescriptions Disp Refills    RIZATRIPTAN 10 MG Oral Tablet Dispersible [Pharmacy Med Name: RIZATRIPTAN ODT 10MG TABLETS] 27 tablet 0     Sig: TAKE 1 TABLET BY MOUTH AS NEEDED FOR MIGRAINE       Neurology Medications Passed - 9/22/2023 10:06 AM        Passed - In person appointment or virtual visit in the past 6 mos or appointment in next 3 mos     Recent Outpatient Visits              2 weeks ago Viral URI    Mae Rodríguez, [address], [address], [address], [facility]    Office Visit    8 months ago Encounter for well woman exam with routine gynecological exam    Tai Segal MD    Office Visit    11 months ago Routine medical exam    Tai Segal MD    Office Visit    1 year ago BMI 45.0-49.9, adult [facility])    Mae Rodríguez, [address], Tai Maria MD    Office Visit    2 years ago Paris Briceño, Tai Maria MD    Office Visit          Future Appointments         Provider Department Appt Notes    In 1 month MD Heidi House Addison Annual physical, no prev px found

## 2023-11-14 RX ORDER — RIZATRIPTAN BENZOATE 10 MG/1
10 TABLET, ORALLY DISINTEGRATING ORAL AS NEEDED
Qty: 10 TABLET | Refills: 0 | Status: SHIPPED | OUTPATIENT
Start: 2023-11-14 | End: 2024-01-25

## 2023-11-14 NOTE — TELEPHONE ENCOUNTER
Refill passed per [facility] protocol.    Requested Prescriptions   Pending Prescriptions Disp Refills    RIZATRIPTAN 10 MG Oral Tablet Dispersible [Pharmacy Med Name: RIZATRIPTAN ODT 10MG TABLETS] 10 tablet 0     Sig: TAKE 1 TABLET BY MOUTH AS NEEDED FOR MIGRAINE       Neurology Medications Passed - 11/13/2023  3:42 AM        Passed - In person appointment or virtual visit in the past 6 mos or appointment in next 3 mos     Recent Outpatient Visits              2 months ago Viral URI    [facility] Ayde San [facility]    Office Visit    10 months ago Encounter for well woman exam with routine gynecological exam    [facility] Erendira Vera MD    Office Visit    1 year ago [facility] exam    [facility]    Office Visit    1 year ago BMI 45.0-49.9, adult ([facility])    [facility] Erendira Vera MD    Office Visit    2 years ago Anxiety    [facility] Erendira Vera MD    Office Visit          Future Appointments         Provider Department Appt Notes    In 2 days Erendira Vera MD [facility] Annual physical, no prev px found                    Recent Outpatient Visits              [facility] ago [facility]    Office Visit    10 months ago Encounter for well woman exam with routine gynecological exam    [facility]    Office Visit    1 year ago Routine medical exam    [facility] Erendira Vera MD    Office Visit    1 year ago BMI 45.0-49.9, adult ([facility])    [facility] Erendira Vera MD     Office Visit    2 years ago Anxiety    [facility] Erendiar Vera MD    Office Visit            Future Appointments         Provider Department Appt Notes    In 2 days Erendira Vera MD [facility], [facility] Annual physical, no prev px found

## 2023-11-16 ENCOUNTER — OFFICE VISIT (OUTPATIENT)
Dept: FAMILY MEDICINE CLINIC | Facility: CLINIC | Age: 43
End: 2023-11-16

## 2023-11-16 VITALS
DIASTOLIC BLOOD PRESSURE: 85 MMHG | SYSTOLIC BLOOD PRESSURE: 132 MMHG | BODY MASS INDEX: 41.17 KG/M2 | WEIGHT: 256.19 LBS | HEART RATE: 66 BPM | HEIGHT: 66 IN

## 2023-11-16 DIAGNOSIS — Z00.00 ROUTINE MEDICAL EXAM: Primary | ICD-10-CM

## 2023-11-16 DIAGNOSIS — E55.9 VITAMIN D DEFICIENCY: ICD-10-CM

## 2023-11-16 DIAGNOSIS — F41.9 ANXIETY: ICD-10-CM

## 2023-11-16 DIAGNOSIS — Z12.31 SCREENING MAMMOGRAM FOR BREAST CANCER: ICD-10-CM

## 2023-11-16 DIAGNOSIS — N92.6 MENSES, IRREGULAR: ICD-10-CM

## 2023-11-16 PROCEDURE — 3079F DIAST BP 80-89 MM HG: CPT | Performed by: FAMILY MEDICINE

## 2023-11-16 PROCEDURE — 99396 PREV VISIT EST AGE 40-64: CPT | Performed by: FAMILY MEDICINE

## 2023-11-16 PROCEDURE — 3008F BODY MASS INDEX DOCD: CPT | Performed by: FAMILY MEDICINE

## 2023-11-16 PROCEDURE — 3075F SYST BP GE 130 - 139MM HG: CPT | Performed by: FAMILY MEDICINE

## 2023-11-16 RX ORDER — RIMEGEPANT SULFATE 75 MG/75MG
75 TABLET, ORALLY DISINTEGRATING ORAL
Qty: 30 TABLET | Refills: 1 | Status: SHIPPED | OUTPATIENT
Start: 2023-11-16 | End: 2023-12-16

## 2023-11-18 ENCOUNTER — LAB ENCOUNTER (OUTPATIENT)
Dept: LAB | Age: 43
End: 2023-11-18
Attending: FAMILY MEDICINE
Payer: COMMERCIAL

## 2023-11-18 DIAGNOSIS — Z00.00 ROUTINE MEDICAL EXAM: ICD-10-CM

## 2023-11-18 DIAGNOSIS — E55.9 VITAMIN D DEFICIENCY: ICD-10-CM

## 2023-11-18 DIAGNOSIS — N92.6 MENSES, IRREGULAR: ICD-10-CM

## 2023-11-18 LAB
ALBUMIN SERPL-MCNC: 4.4 G/DL (ref 3.2–4.8)
ALBUMIN/GLOB SERPL: 1.7 {RATIO} (ref 1–2)
ALP LIVER SERPL-CCNC: 101 U/L
ALT SERPL-CCNC: 34 U/L
ANION GAP SERPL CALC-SCNC: 6 MMOL/L (ref 0–18)
AST SERPL-CCNC: 17 U/L (ref ?–34)
BASOPHILS # BLD AUTO: 0.06 X10(3) UL (ref 0–0.2)
BASOPHILS NFR BLD AUTO: 0.7 %
BILIRUB SERPL-MCNC: 1.1 MG/DL (ref 0.3–1.2)
BUN BLD-MCNC: 11 MG/DL (ref 9–23)
BUN/CREAT SERPL: 18.3 (ref 10–20)
CALCIUM BLD-MCNC: 9.1 MG/DL (ref 8.7–10.4)
CHLORIDE SERPL-SCNC: 106 MMOL/L (ref 98–112)
CHOLEST SERPL-MCNC: 219 MG/DL (ref ?–200)
CO2 SERPL-SCNC: 28 MMOL/L (ref 21–32)
CREAT BLD-MCNC: 0.6 MG/DL
DEPRECATED RDW RBC AUTO: 42.4 FL (ref 35.1–46.3)
EGFRCR SERPLBLD CKD-EPI 2021: 116 ML/MIN/1.73M2 (ref 60–?)
EOSINOPHIL # BLD AUTO: 0.15 X10(3) UL (ref 0–0.7)
EOSINOPHIL NFR BLD AUTO: 1.8 %
ERYTHROCYTE [DISTWIDTH] IN BLOOD BY AUTOMATED COUNT: 13.2 % (ref 11–15)
EST. AVERAGE GLUCOSE BLD GHB EST-MCNC: 108 MG/DL (ref 68–126)
FASTING PATIENT LIPID ANSWER: YES
FASTING STATUS PATIENT QL REPORTED: YES
FSH SERPL-ACNC: 7.7 MIU/ML
GLOBULIN PLAS-MCNC: 2.6 G/DL (ref 2.8–4.4)
GLUCOSE BLD-MCNC: 95 MG/DL (ref 70–99)
HBA1C MFR BLD: 5.4 % (ref ?–5.7)
HCT VFR BLD AUTO: 42.6 %
HDLC SERPL-MCNC: 42 MG/DL (ref 40–59)
HGB BLD-MCNC: 13.8 G/DL
IMM GRANULOCYTES # BLD AUTO: 0.02 X10(3) UL (ref 0–1)
IMM GRANULOCYTES NFR BLD: 0.2 %
LDLC SERPL CALC-MCNC: 155 MG/DL (ref ?–100)
LYMPHOCYTES # BLD AUTO: 2.76 X10(3) UL (ref 1–4)
LYMPHOCYTES NFR BLD AUTO: 33.5 %
MCH RBC QN AUTO: 28.2 PG (ref 26–34)
MCHC RBC AUTO-ENTMCNC: 32.4 G/DL (ref 31–37)
MCV RBC AUTO: 86.9 FL
MONOCYTES # BLD AUTO: 0.45 X10(3) UL (ref 0.1–1)
MONOCYTES NFR BLD AUTO: 5.5 %
NEUTROPHILS # BLD AUTO: 4.79 X10 (3) UL (ref 1.5–7.7)
NEUTROPHILS # BLD AUTO: 4.79 X10(3) UL (ref 1.5–7.7)
NEUTROPHILS NFR BLD AUTO: 58.3 %
NONHDLC SERPL-MCNC: 177 MG/DL (ref ?–130)
OSMOLALITY SERPL CALC.SUM OF ELEC: 289 MOSM/KG (ref 275–295)
PLATELET # BLD AUTO: 359 10(3)UL (ref 150–450)
POTASSIUM SERPL-SCNC: 3.8 MMOL/L (ref 3.5–5.1)
PROT SERPL-MCNC: 7 G/DL (ref 5.7–8.2)
RBC # BLD AUTO: 4.9 X10(6)UL
SODIUM SERPL-SCNC: 140 MMOL/L (ref 136–145)
TRIGL SERPL-MCNC: 122 MG/DL (ref 30–149)
TSI SER-ACNC: 0.85 MIU/ML (ref 0.55–4.78)
VIT B12 SERPL-MCNC: 634 PG/ML (ref 211–911)
VIT D+METAB SERPL-MCNC: 26.8 NG/ML (ref 30–100)
VLDLC SERPL CALC-MCNC: 23 MG/DL (ref 0–30)
WBC # BLD AUTO: 8.2 X10(3) UL (ref 4–11)

## 2023-11-18 PROCEDURE — 83001 ASSAY OF GONADOTROPIN (FSH): CPT

## 2023-11-18 PROCEDURE — 80061 LIPID PANEL: CPT

## 2023-11-18 PROCEDURE — 80053 COMPREHEN METABOLIC PANEL: CPT

## 2023-11-18 PROCEDURE — 36415 COLL VENOUS BLD VENIPUNCTURE: CPT

## 2023-11-18 PROCEDURE — 83036 HEMOGLOBIN GLYCOSYLATED A1C: CPT

## 2023-11-18 PROCEDURE — 84443 ASSAY THYROID STIM HORMONE: CPT

## 2023-11-18 PROCEDURE — 85025 COMPLETE CBC W/AUTO DIFF WBC: CPT

## 2023-11-18 PROCEDURE — 82671 ASSAY OF ESTROGENS: CPT

## 2023-11-18 PROCEDURE — 82607 VITAMIN B-12: CPT

## 2023-11-18 PROCEDURE — 82306 VITAMIN D 25 HYDROXY: CPT

## 2023-11-21 NOTE — PROGRESS NOTES
Your cholesterol is higher this year. Please focus on healthy low fat diet and increase exercise to improve this number.  Your vitamin D levels are a bit low - please take extra vitamin D 2000 units daily. - Dr. Sanju Mata

## 2023-11-22 ENCOUNTER — TELEPHONE (OUTPATIENT)
Dept: FAMILY MEDICINE CLINIC | Facility: CLINIC | Age: 43
End: 2023-11-22

## 2023-11-22 LAB
ESTRADIOL: 50.9 PG/ML
ESTRONE: 39 PG/ML

## 2024-01-08 ENCOUNTER — IMMUNIZATION (OUTPATIENT)
Dept: FAMILY MEDICINE CLINIC | Facility: CLINIC | Age: 44
End: 2024-01-08

## 2024-01-08 DIAGNOSIS — Z23 NEED FOR VACCINATION: Primary | ICD-10-CM

## 2024-01-08 PROCEDURE — 90471 IMMUNIZATION ADMIN: CPT | Performed by: FAMILY MEDICINE

## 2024-01-08 PROCEDURE — 90686 IIV4 VACC NO PRSV 0.5 ML IM: CPT | Performed by: FAMILY MEDICINE

## 2024-01-23 ENCOUNTER — OFFICE VISIT (OUTPATIENT)
Dept: OTOLARYNGOLOGY | Facility: CLINIC | Age: 44
End: 2024-01-23
Payer: COMMERCIAL

## 2024-01-23 VITALS — HEIGHT: 66 IN | WEIGHT: 254.63 LBS | BODY MASS INDEX: 40.92 KG/M2

## 2024-01-23 DIAGNOSIS — H61.23 CERUMEN DEBRIS ON TYMPANIC MEMBRANE OF BOTH EARS: Primary | ICD-10-CM

## 2024-01-23 PROCEDURE — 3008F BODY MASS INDEX DOCD: CPT | Performed by: SPECIALIST

## 2024-01-23 PROCEDURE — 69210 REMOVE IMPACTED EAR WAX UNI: CPT | Performed by: SPECIALIST

## 2024-01-24 NOTE — PATIENT INSTRUCTIONS
When was fully cleaned from both your ears.  Follow-up in 6 months time, sooner if problems.  Avoid using cotton swabs.  Can use white vinegar and rubbing alcohol for the moistness in your ears.

## 2024-01-24 NOTE — PROGRESS NOTES
Ears = bilateral cerumen occlussions.    Fully cleaned under microscope using instrumentation and suctioning.    Normal tympanic membranes.  Avoid using cotton swabs.  Follow-up in 6 months time, sooner if problems.  Can use white vinegar and rubbing alcohol for the moistness in your ears.

## 2024-01-25 RX ORDER — RIZATRIPTAN BENZOATE 10 MG/1
10 TABLET, ORALLY DISINTEGRATING ORAL AS NEEDED
Qty: 10 TABLET | Refills: 0 | Status: SHIPPED | OUTPATIENT
Start: 2024-01-25

## 2024-01-26 NOTE — TELEPHONE ENCOUNTER
Refill Passed Per Protocol    Requested Prescriptions   Pending Prescriptions Disp Refills    RIZATRIPTAN 10 MG Oral Tablet Dispersible [Pharmacy Med Name: RIZATRIPTAN ODT 10MG TABLETS] 27 tablet 0     Sig: DISSOLVE ONE TABLET BY MOUTH AS NEEDED FOR MIGRAINE       Neurology Medications Passed - 1/25/2024  3:41 AM        Passed - In person appointment or virtual visit in the past 6 mos or appointment in next 3 mos     Recent Outpatient Visits              2 days ago Cerumen debris on tympanic membrane of both [facility]    [facility]    2 months ago Routine medical exam    [facility]    Office Visit    4 months ago [facility]al URI    [facility]    Office Visit    1 year ago Encounter for well woman exam with routine gynecological exam    [facility]    Office Visit    1 year ago Routine medical exam    [facility]    [facility] Visit          Future Appointments         Provider Department Appt Notes    In 4 months ADO DEXA RM1; ADO ROMAINE RM1 [facility] [place]                     Future Appointments         Provider Department Appt Notes    In 4 months ADO DEXA RM1; ADO [facility] RM1 [facility] [place]           Recent Outpatient Visits              2 days ago Cerumen debris on tympanic membrane of both ears    [facility]    Office Visit    2 months ago Routine medical exam    [facility]    Office Visit    4 months ago Viral URI    [facility]    Office Visit    1 year  ago Encounter for well woman exam with routine gynecological exam    [facility] Dre Sandra Laura Beth, MD    Office Visit    1 year ago Routine medical exam    [facility], [facility]    Office Visit

## 2024-02-28 ENCOUNTER — OFFICE VISIT (OUTPATIENT)
Dept: FAMILY MEDICINE CLINIC | Facility: CLINIC | Age: 44
End: 2024-02-28
Payer: COMMERCIAL

## 2024-02-28 VITALS
SYSTOLIC BLOOD PRESSURE: 141 MMHG | DIASTOLIC BLOOD PRESSURE: 79 MMHG | BODY MASS INDEX: 41.95 KG/M2 | WEIGHT: 261 LBS | RESPIRATION RATE: 16 BRPM | OXYGEN SATURATION: 97 % | HEIGHT: 66 IN | HEART RATE: 100 BPM | TEMPERATURE: 98 F

## 2024-02-28 DIAGNOSIS — J00 NASOPHARYNGITIS: Primary | ICD-10-CM

## 2024-02-28 PROCEDURE — 87880 STREP A ASSAY W/OPTIC: CPT | Performed by: NURSE PRACTITIONER

## 2024-02-28 PROCEDURE — 3077F SYST BP >= 140 MM HG: CPT | Performed by: NURSE PRACTITIONER

## 2024-02-28 PROCEDURE — 99213 OFFICE O/P EST LOW 20 MIN: CPT | Performed by: NURSE PRACTITIONER

## 2024-02-28 PROCEDURE — 87081 CULTURE SCREEN ONLY: CPT | Performed by: NURSE PRACTITIONER

## 2024-02-28 PROCEDURE — 3078F DIAST BP <80 MM HG: CPT | Performed by: NURSE PRACTITIONER

## 2024-02-28 PROCEDURE — 3008F BODY MASS INDEX DOCD: CPT | Performed by: NURSE PRACTITIONER

## 2024-02-28 NOTE — PROGRESS NOTES
CHIEF COMPLAINT:     Chief Complaint   Patient presents with    Sore Throat     ST x yest, bump to back of throat, body aches  Denies fever   OTC Tylenol        HPI:   Hetal Weston is a 43 year old female presents to clinic with complaint of sore throat. Symptoms x2 days.  Also reports HA, body aches, mild nasal congestion.  Denies fever, cough, dyspnea/SOB/chest pain, GI complaints, ear pain, or rashes.  Tolerating PO.  Daughter had some mild cold symptoms just prior.  Last Hx of covid several years ago.  Taking tylenol for sxs.    Current Outpatient Medications   Medication Sig Dispense Refill    rizatriptan 10 MG Oral Tablet Dispersible Take 1 tablet (10 mg total) by mouth as needed for Migraine. 10 tablet 0    sertraline 50 MG Oral Tab Take 1.5 tablets (75 mg total) by mouth daily. 135 tablet 2      Past Medical History:   Diagnosis Date    Anemia     with first pregnancy    Anxiety 07/19/2021    Gestational diabetes (HCC)     Morbid obesity with BMI of 45.0-49.9, adult ([facility]) 2018    Tension headache     Varicella     Had chicken pox childhood      Social History:  Social History     Socioeconomic History    Marital status:    Tobacco Use    Smoking status: Never    Smokeless tobacco: Never   Vaping Use    Vaping Use: Never used   Substance and Sexual Activity    Alcohol use: Yes     Comment: Socially sometimes, a few times a year    Drug use: No   Other Topics Concern    Caffeine Concern No     Comment: Coffee/Soda        REVIEW OF SYSTEMS:   GENERAL HEALTH: feels well otherwise, normal appetite  SKIN: denies any unusual skin lesions or rashes  HEENT: denies ear pain or difficulty swallowing/eating. See HPI  RESPIRATORY: denies shortness of breath or wheezing  CARDIOVASCULAR: denies chest pain or palpitations   GI: denies vomiting or diarrhea  NEURO: denies dizziness or lightheadedness    EXAM:   /79   Pulse 100   Temp 98.3 °F (36.8 °C)   Resp 16   Ht 5' 6\" (1.676 m)   Wt 261 lb (118.4 kg)    LMP 02/23/2024   SpO2 97%   BMI 42.13 kg/m²   GENERAL: well developed, well nourished, in no apparent distress  SKIN: no rashes, no suspicious lesions  HEAD: atraumatic, normocephalic  EYES: conjunctiva clear, EOM intact  EARS: TM's clear, non-injected, no bulging, retraction, or fluid bilaterally  NOSE: nostrils patent, no exudates, nasal mucosa pink and noninflamed  THROAT: oral mucosa pink, moist. +Posterior pharynx erythematous and injected. Tonsils 2+/4.  No exudates. Uvula midline.  NECK: supple, non-tender  LUNGS: clear to auscultation bilaterally, no wheezes or rhonchi. Breathing is non labored. No cough.  CARDIO: RRR without murmur  LYMPH: No lymphadenopathy.    Recent Results (from the past 24 hour(s))   Rapid Strep    Collection Time: 02/28/24 12:52 PM   Result Value Ref Range    Strep Grp A Screen Negative Negative    Control Line Present with a clear background (yes/no) Yes Yes/No    Kit Lot # [id number] Numeric    Kit Expiration Date 4/22/25 Date         ASSESSMENT AND PLAN:   Assessment:     Hetal was seen today for sore throat.    Diagnoses and all orders for this visit:    Nasopharyngitis  -     Rapid Strep  -     Grp A Strep Cult, Throat [E]; Future  -     SARS-CoV-2 by PCR; Future  -     SARS-CoV-2 by PCR  -     Grp A Strep Cult, Throat [E]        Plan:   - Discussed that due to symptoms and negative rapid strep this is most likely viral and does not require antibiotics.   - Will send throat culture per pt request and contact pt with results.  - COVID PCR to lab.  - Comfort measures explained and discussed as listed in Patient Instructions.  - Advised follow up within 1 week if not improving, condition worsens, or new/persistent fevers.  - Pt verbalizes understanding and is agreeable w/ plan.    There are no Patient Instructions on file for this visit.

## 2024-02-29 ENCOUNTER — TELEPHONE (OUTPATIENT)
Dept: FAMILY MEDICINE CLINIC | Facility: CLINIC | Age: 44
End: 2024-02-29

## 2024-02-29 LAB — SARS-COV-2 RNA RESP QL NAA+PROBE: DETECTED

## 2024-02-29 NOTE — TELEPHONE ENCOUNTER
The patient has been deemed a candidate for Paxlovid.  Symptoms began less than 5 days ago.  Patient does not require hospitalization.   Patient has the following risks factors BMI.    Labs reveal no significant history of liver or kidney problems/conditions.   Per [facility] drug interaction : none    Reviewed “Fact Sheet for Patients, Parents and Caregivers” given EUA via [employer] message.   Discussed use, dose, and possible side effects.    The patient agrees to treatment with Paxlovid.   Advised to continue to self-isolate and use infection control measures according to CDC guidelines.    Requested Prescriptions     Signed Prescriptions Disp Refills    nirmatrelvir-ritonavir 300-100 MG Oral Tablet Therapy Pack 30 tablet 0     Sig: Take two nirmatrelvir tablets (300mg) with one ritonavir tablet (100mg) together twice daily for 5 days.     Authorizing Provider: NITHIN MATIAS

## 2024-04-10 ENCOUNTER — PATIENT MESSAGE (OUTPATIENT)
Dept: FAMILY MEDICINE CLINIC | Facility: CLINIC | Age: 44
End: 2024-04-10

## 2024-04-10 RX ORDER — SERTRALINE HYDROCHLORIDE 100 MG/1
100 TABLET, FILM COATED ORAL DAILY
Qty: 90 TABLET | Refills: 4 | Status: SHIPPED | OUTPATIENT
Start: 2024-04-10

## 2024-04-10 NOTE — TELEPHONE ENCOUNTER
Dr. Vera please read pt's [employer] message.     Sertraline 100mg pended.    Please advise.

## 2024-04-10 NOTE — TELEPHONE ENCOUNTER
From: Hetal Weston  To: Erendira Vera  Sent: 4/10/2024 11:14 AM CDT  Subject: Sertraline update    Good morning Dr. Vera,    I know you mentioned last appointment that if I feel like I need a higher dosage of the sertraline, I could go up another 25 mg to 100 mg. Is there a way you could update the prescription? There's days where I feel I need the 100 mg. And I don't want to run out before the next refill. Thank you in advance!

## 2024-05-30 ENCOUNTER — HOSPITAL ENCOUNTER (OUTPATIENT)
Dept: MAMMOGRAPHY | Age: 44
Discharge: HOME | End: 2024-05-30
Attending: FAMILY MEDICINE
Payer: COMMERCIAL

## 2024-05-30 DIAGNOSIS — Z12.31 SCREENING MAMMOGRAM FOR BREAST CANCER: ICD-10-CM

## 2024-05-30 PROCEDURE — 77067 SCR MAMMO BI INCL CAD: CPT | Performed by: FAMILY MEDICINE

## 2024-05-30 PROCEDURE — 77063 BREAST TOMOSYNTHESIS BI: CPT | Performed by: FAMILY MEDICINE

## 2024-06-07 ENCOUNTER — APPOINTMENT (OUTPATIENT)
Dept: GENERAL RADIOLOGY | Age: 44
End: 2024-06-07
Attending: NURSE PRACTITIONER
Payer: COMMERCIAL

## 2024-06-07 ENCOUNTER — APPOINTMENT (OUTPATIENT)
Dept: CT IMAGING | Age: 44
End: 2024-06-07
Attending: NURSE PRACTITIONER
Payer: COMMERCIAL

## 2024-06-07 ENCOUNTER — HOSPITAL ENCOUNTER (OUTPATIENT)
Age: 44
Discharge: HOME | End: 2024-06-07
Payer: COMMERCIAL

## 2024-06-07 VITALS
SYSTOLIC BLOOD PRESSURE: 130 MMHG | OXYGEN SATURATION: 98 % | RESPIRATION RATE: 18 BRPM | HEART RATE: 70 BPM | TEMPERATURE: 97 F | DIASTOLIC BLOOD PRESSURE: 75 MMHG

## 2024-06-07 DIAGNOSIS — S22.089A CLOSED FRACTURE OF TWELFTH THORACIC VERTEBRA, UNSPECIFIED FRACTURE MORPHOLOGY, INITIAL ENCOUNTER (HCC): ICD-10-CM
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
## 2024-06-07 DIAGNOSIS — S22.089A CLOSED FRACTURE OF ELEVENTH THORACIC VERTEBRA, UNSPECIFIED FRACTURE MORPHOLOGY, INITIAL ENCOUNTER (HCC): Primary | ICD-10-CM

## 2024-06-07 PROCEDURE — 72072 X-RAY EXAM THORAC SPINE 3VWS: CPT | Performed by: NURSE PRACTITIONER

## 2024-06-07 PROCEDURE — 99214 OFFICE O/P EST MOD 30 MIN: CPT | Performed by: NURSE PRACTITIONER

## 2024-06-07 PROCEDURE — 72128 CT CHEST SPINE W/O DYE: CPT | Performed by: NURSE PRACTITIONER

## 2024-06-07 PROCEDURE — 71046 X-RAY EXAM CHEST 2 VIEWS: CPT | Performed by: NURSE PRACTITIONER

## 2024-06-07 PROCEDURE — 72100 X-RAY EXAM L-S SPINE 2/3 VWS: CPT | Performed by: NURSE PRACTITIONER

## 2024-06-07 RX ORDER — HYDROCODONE BITARTRATE AND ACETAMINOPHEN 5; 325 MG/1; MG/1
1-2 TABLET ORAL NIGHTLY
Qty: 14 TABLET | Refills: 0 | Status: SHIPPED | OUTPATIENT
Start: 2024-06-07 | End: 2024-06-14

## 2024-06-07 NOTE — ED PROVIDER NOTES
Patient Seen in: [facility]    History   CC: fall  HPI: Hetal Weston 44 year old female  who presents c/o  middle back pain which began after fall off of garage roof yesterday 1330. States there was a object on the roof she was attempting to clear however it was windy and she lost her balance. +hit her head on a pole on the garage however denies hitting her head on ground with fall. Denies loc. Denies headache, dizziness, vision changes, cp, trisha. Denies pain/injury to extremities x4. Denies radicular s/s - numbness, tingling, weakness or limitation in range of motion to her upper or lower extremities.  Denies saddle anesthesia or loss of bowel or bladder control.  Denies urinary changes/complaints, abdominal pain or vomiting. +abrasions to her back and posterior shoulders from landing on gravel ground.    Past Medical History:    Anemia    with first pregnancy    Anxiety    Gestational diabetes (HCC)    Morbid obesity with BMI of 45.0-49.9, adult (HCC)    Tension headache    Varicella    Had chicken pox childhood       Past Surgical History:   Procedure Laterality Date      12/3/07;18    Other surgical history  11/10/2022    Vertical Sleeve Surgery       Family History   Problem Relation Age of Onset    Diabetes Mother         Type 2    Hypertension Father     Diabetes Father         Type 2    Breast Cancer Sister 39    Cancer Sister         Breast cancer    Diabetes Maternal Grandfather         Type 2    Breast Cancer Maternal Aunt         post    Breast Cancer Maternal Aunt         post       Social History     Socioeconomic History    Marital status:    Tobacco Use    Smoking status: Never    Smokeless tobacco: Never   Vaping Use    Vaping status: Never Used   Substance and Sexual Activity    Alcohol use: Yes     Comment: Socially sometimes, a few times a year    Drug use: No   Other Topics Concern    Caffeine Concern No     Comment: Coffee/Soda       ROS:  Review of  Systems    Positive for stated complaint: Back Pain - Had an accident yesterday, fell off top of garage.  Other systems are as noted in HPI.  Constitutional and vital signs reviewed.      All other systems reviewed and negative except as noted above.    PSFH elements reviewed from today and agreed except as otherwise stated in HPI.             Constitutional and vital signs reviewed.        Physical Exam     ED Triage Vitals [06/07/24 1348]   /82   Pulse 73   Resp 18   Temp 97 °F (36.1 °C)   Temp src Temporal   SpO2 97 %   O2 Device None (Room air)       Current:/75   Pulse 70   Temp 97 °F (36.1 °C) (Temporal)   Resp 18   LMP 05/22/2024   SpO2 98%         PE:  General - Appears well, non-toxic and in NAD  Head - Appears symmetrical without deformity/swelling cranium, scalp, or facial bones, no tenderness on palpation throughout, TMJ bilat without crepitus or limited ROM  + there is a subtle, non-raised abrasion noted to right parietal scalp without bleeding.  Eyes - PERRLA, sclera not injected, no discharge noted, no periorbital edema, no pain/difficulty with EOM  ENT - EAC bilaterally without discharge, TM is visualized intact  Oropharynx clear, voice is clear  Neck - supple with trachea midline, no tenderness upon palpation to posterior c-spine, no obvious sign of trauma/swelling/step-off, full ROM with strong motor strength against resistance  Resp - Lung sounds clear bilaterally and wob unlabored, good aeration with equal, even expansion bilaterally   CV - RRR  GI - Appears round and flat, BS +x4 quadrants, no tenderness/guarding with palpation  Skin -nonbleeding superficial abrasions noted to the left posterior shoulder as well as right-sided mid thoracic back.  Skin is otherwise pink warm and dry throughout, mmm, cap refill <2seconds  Neuro - A&O x4, sensation equal to both medial and lateral aspects of extremities, steady gait  On exam.  Steady Romberg  MSK - makes purposeful movements of all  extremities with full ROM noted, hand grasp, foot press/dorsiflexion and straight leg raise strength equal bilat, radial and pedal pulses 2+ bilat.  + Lower thoracic and diffuse lumbar tenderness midline on palpation without obvious sign of trauma/swelling/step-off/deformity, +flexion of the 1st and 5th toes bilat.  Psych - Interactive and appropriate      ED Course   Labs Reviewed - No data to display    MDM     XR THORACIC SPINE (3 VIEWS) (CPT=72072) (Final result)  Result time 06/07/24 14:39:45  Final result by Naren Pat MD (06/07/24 14:39:45)                Impression:    PROCEDURE: XR THORACIC SPINE (3 VIEWS) (CPT=72072)     COMPARISON: [facility], XR LUMBAR SPINE (MIN 2 VIEWS) (CPT=72100), 6/07/2024, 2:15 PM.     INDICATIONS: Pain in middle of back bilaterally post fall from garage yesterday.     TECHNIQUE: Thoracic spine radiographs (3 views)     Findings and impression:     Lateral view shows 20-25 percent anterior wedging of T11 and T12 consistent with mild compression fractures.  These are age indeterminate     No other compression fractures     Normal thoracic alignment with mild-to-moderate anterior spurs throughout the mid to lower thoracic spine              Dictated by (CST): Angel Pat MD on 6/07/2024 at 2:36 PM      Finalized by (CST): Angel Pat MD on 6/07/2024 at 2:39 PM                  Narrative:                                 XR LUMBAR SPINE (MIN 2 VIEWS) (CPT=72100) (Final result)  Result time 06/07/24 14:40:19  Final result by Naren Pat MD (06/07/24 14:40:19)                Impression:    PROCEDURE: XR LUMBAR SPINE (MIN 2 VIEWS) (CPT=72100)     COMPARISON: None.     INDICATIONS: Pain in middle of back bilaterally post fall from garage yesterday.     TECHNIQUE: Lumbar spine radiographs (2-3 views)       Findings and impression:     Normal lumbar alignment.  No lumbar compression fracture or significant disc space narrowing     Mild anterior wedge  deformity at T11 and T12 again seen             Dictated by (CST): Angel Pat MD on 6/07/2024 at 2:39 PM      Finalized by (CST): Angel Pat MD on 6/07/2024 at 2:40 PM                  Narrative:                                 XR CHEST PA + LAT CHEST (CPT=71046) (Final result)  Result time 06/07/24 14:36:39  Final result by Naren Pat MD (06/07/24 14:36:39)                Impression:    PROCEDURE: XR CHEST PA + LAT CHEST (CPT=71046)     COMPARISON: None.     INDICATIONS: Pain in middle of back bilaterally post fall from garage yesterday.     TECHNIQUE:   Two views.       Findings and impression:     Normal heart and mediastinum     Clear lungs     Normal pleura     No displaced fracture             Dictated by (CST): Angel Pat MD on 6/07/2024 at 2:36 PM      Finalized by (CST): Angel Pat MD on 6/07/2024 at 2:36 PM                  Narrative:             CT SPINE THORACIC (CPT=72128) (Final result)  Result time 06/07/24 16:17:49  Final result by Abdullahi Trejo (06/07/24 16:17:49)                Impression:    CONCLUSION:     Mild anterior wedging of the T11 and T12 vertebral bodies, which reflect mild age-indeterminate compression fracture deformities.  No retropulsion or involvement of the posterior elements.     Mild multilevel degenerative change of the thoracic spine without significant canal stenosis or foraminal narrowing.     No traumatic listhesis.     Lesser incidental findings described above.           Dictated by (CST): Abdullahi Trejo MD on 6/07/2024 at 4:03 PM      Finalized by (CST): Abdullahi Trejo MD on 6/07/2024 at 4:17 PM                  Narrative:    PROCEDURE: CT SPINE THORACIC (CPT=72128)     COMPARISON: [facility], XR THORACIC SPINE (3 VIEWS) (CPT=72072), 6/07/2024, 2:15 PM.  [facility], XR LUMBAR SPINE (MIN 2 VIEWS) (CPT=72100), 6/07/2024, 2:15 PM.     INDICATIONS: +fx noted on xray to t11&12, need to determine retropulsion. +fell  off a garage roof yesterday, back pain     TECHNIQUE:   Multi-planar CT images were obtained without intravenous contrast.  Automated exposure control for dose reduction was used. Adjustment of the mA and/or kV was done based on the patient's size. Use of iterative reconstruction technique for  dose reduction was used.  Dose information is transmitted to the ACR (American College of Radiology) NRDR (National Radiology Data Registry) which includes the Dose Index Registry.     FINDINGS:  PARASPINAL AREA: No mass.  No hematoma.  BONES: Mild anterior wedging of the T11-T12 vertebral bodies when compared to the rest of the thoracic spine, which reflect age-indeterminate compression fractures.  No retropulsion.  No involvement of the posterior elements.  There is mild sclerosis  involving the anterosuperior endplate of T11 and T12.  There are some areas of cortical irregularity involving the inferior endplates of T11-T12, which are nonspecific.  No retropulsion.  No involvement of the posterior elements.  The remaining thoracic  vertebral body heights are maintained.  ALIGNMENT: The thoracic kyphosis is intact.  No listhesis.  DISCS: Mild multilevel disc height loss.  There are few small ventral disc osteophyte complexes.  There is mild multilevel degenerative endplate change.  No canal stenosis.  No foraminal narrowing.  OTHER:   The visualized lungs are clear.  No mediastinal lymphadenopathy.  There postoperative changes from prior gastric bypass.              DDx: Fracture versus sprain versus contusion versus abrasions    X-ray results as noted above and discussed with patient.  Age-indeterminate compression fracture to T11 and 12.  Likely acute as patient has pain to this area with recent fall from the top of a garage/roof.  Advised CT to ensure no cord compression/retropulsion.  Patient is agreeable. Neuro exam normal.    CT also shows mild wedging of T11 and 12 to correlate with x-ray for compression fractures  however no posterior element involvement. Results discussed with patient as well as general compression fracture instructions and precautions, rest, close follow-up with orthopedics and strict ED/return precautions reviewed.  Chart review shows last tetanus 2018.  Patient is historian and demonstrates understanding of all instruction and agrees with plan of care.  This case was also discussed with Dr. Justice who agrees with plan of care.      Disposition and Plan     Clinical Impression:  1. Closed fracture of eleventh thoracic vertebra, unspecified fracture morphology, initial encounter ([facility])    2. Closed fracture of twelfth thoracic vertebra, unspecified fracture morphology, initial encounter ([facility])    3. Fall, initial encounter        Disposition:  Discharge    Follow-up:  Jens Copeland MD  [address] [address]  [phone]    Go in 3 days        Medications Prescribed:  Discharge Medication List as of 6/7/2024  4:52 PM        START taking these medications    Details   HYDROcodone-acetaminophen 5-325 MG Oral Tab Take 1-2 tablets by mouth at bedtime for 7 days., Normal, Disp-14 tablet, R-0

## 2024-06-07 NOTE — DISCHARGE INSTRUCTIONS
You may take Tylenol if needed for discomfort during the daytime.  At night, you may use the Norco as prescribed however be aware each Norco tablet contains 325 mg of Tylenol.  You should not exceed 1000 mg of Tylenol in an 6-hour period.  Be aware that Norco can cause drowsiness and you should not take it before driving, using with alcohol, or before making important decisions.  Rest from exacerbating activities but do not stay sedentary. Follow up with your  the orthopedic surgeon provided within the next 4 days - if symptoms do not resolve, you may need further treatment. Seek additional care in the ER immediately for numbness in your groin, loss of bowel or bladder function, inability to walk, or other new/worsening symptoms.

## 2024-06-07 NOTE — ED INITIAL ASSESSMENT (HPI)
S/p fall off the garage. Tripped and fall. Hit head on the carport. No LOC. C/o middle of back pain.

## 2024-06-10 ENCOUNTER — TELEPHONE (OUTPATIENT)
Dept: ORTHOPEDICS CLINIC | Facility: CLINIC | Age: 44
End: 2024-06-10

## 2024-06-10 DIAGNOSIS — S22.080G CLOSED WEDGE COMPRESSION FRACTURE OF T11 VERTEBRA WITH DELAYED HEALING, SUBSEQUENT ENCOUNTER: Primary | ICD-10-CM

## 2024-06-10 DIAGNOSIS — S22.080G CLOSED WEDGE COMPRESSION FRACTURE OF T12 VERTEBRA WITH DELAYED HEALING: ICD-10-CM

## 2024-06-10 NOTE — TELEPHONE ENCOUNTER
Spoke with patient. Did offer phone number for EMG to possibly seen earlier but she would really like to stay with Dr. Copeland. 2 mild compression fractures in thoracic spine. Patient has an appointment on 6/18. I advised she not drive until further assessed, definitely not if she is taking opioids, which she states is only taking at night, only tylenol during the day

## 2024-06-10 NOTE — TELEPHONE ENCOUNTER
Pt called.  Pt went to immediate care on Friday 6-7-24 for fractured back.  Advised to be seen in three days.  No appointments available.  Pt did schedule 6-18-24.  Can pt be seen sooner.  Is pt able to drive.  Please call pt

## 2024-06-11 ENCOUNTER — TELEPHONE (OUTPATIENT)
Dept: ORTHOPEDICS CLINIC | Facility: CLINIC | Age: 44
End: 2024-06-11

## 2024-06-11 NOTE — TELEPHONE ENCOUNTER
Patient calling to confirm if MRI is needed to be had before or after appointment scheduled on 06.18.24. Patient states she called to schedule MRI and soonest available is 07.23.24. Would a high priority be able to placed on order for it to get done sooner if needed for first appointment? Please advise.

## 2024-06-11 NOTE — TELEPHONE ENCOUNTER
Called patient and informed her per Dr Copeland's orders. Patient already has MRI scheduled on 7/23/24 and she is on wait list for sooner appointment. I advised she can call scheduling back and check for sooner cancellations, however insurance might not approve MRI until evaluated by Dr Copeland. Patient will keep her 6/18 appointment with Dr Copeland.

## 2024-06-11 NOTE — TELEPHONE ENCOUNTER
She had a CT scan for the thoracic spine which showed age-indeterminate fractures of T11 and T12.  I placed an order for MRI thoracic spine on the chart which hopefully can get done before I see her on 6/18/2024.  The MRI will tell us if these fractures are new or old.  CT scan often cannot do that.

## 2024-06-18 ENCOUNTER — OFFICE VISIT (OUTPATIENT)
Dept: ORTHOPEDICS CLINIC | Facility: CLINIC | Age: 44
End: 2024-06-18

## 2024-06-18 VITALS — HEIGHT: 62.4 IN | BODY MASS INDEX: 46.7 KG/M2 | WEIGHT: 257 LBS

## 2024-06-18 DIAGNOSIS — S22.080G CLOSED WEDGE COMPRESSION FRACTURE OF T12 VERTEBRA WITH DELAYED HEALING: Primary | ICD-10-CM

## 2024-06-18 DIAGNOSIS — S22.080G CLOSED WEDGE COMPRESSION FRACTURE OF T11 VERTEBRA WITH DELAYED HEALING, SUBSEQUENT ENCOUNTER: ICD-10-CM

## 2024-06-18 PROCEDURE — 3008F BODY MASS INDEX DOCD: CPT | Performed by: ORTHOPAEDIC SURGERY

## 2024-06-18 PROCEDURE — 99244 OFF/OP CNSLTJ NEW/EST MOD 40: CPT | Performed by: ORTHOPAEDIC SURGERY

## 2024-06-18 RX ORDER — CALCITONIN SALMON 200 [IU]/.09ML
1 SPRAY, METERED NASAL DAILY
Qty: 1 EACH | Refills: 0 | Status: SHIPPED | OUTPATIENT
Start: 2024-06-18

## 2024-06-18 RX ORDER — MULTIVIT-MIN/FA/LYCOPEN/LUTEIN .4-300-25
1 TABLET ORAL DAILY
Qty: 60 TABLET | Refills: 0 | Status: SHIPPED | OUTPATIENT
Start: 2024-06-18

## 2024-06-18 RX ORDER — GUARN/MA-HUANG/P.GIN/S.GINSENG
1 TABLET ORAL 2 TIMES DAILY WITH MEALS
Qty: 60 TABLET | Refills: 0 | Status: SHIPPED | OUTPATIENT
Start: 2024-06-18

## 2024-06-18 NOTE — TELEPHONE ENCOUNTER
Please review. Protocol Failed; No Protocol    MyChart message sent to patient to schedule an office visit with Provider and/or  Routed to Patient  for assistance with appointment.     Requested Prescriptions   Pending Prescriptions Disp Refills    RIZATRIPTAN 10 MG Oral Tablet Dispersible [Pharmacy Med Name: RIZATRIPTAN ODT 10MG TABLETS] 10 tablet 0     Sig: TAKE 1 TABLET BY MOUTH AS NEEDED FOR MIGRAINE       Neurology Medications Failed - 6/16/2024  9:41 AM        Failed - In person appointment or virtual visit in the past 6 mos or appointment in next 3 mos     Recent Outpatient Visits              Today Closed wedge compression fracture of T12 vertebra with delayed healing    [facility], Jens Baron MD    Office Visit    3 months ago Nasopharyngitis    [facility], Ayde Jackson APN    Office Visit    4 months ago Cerumen debris on tympanic membrane of both ears    [facility], Erendira Dooley MD    Office Visit    7 months ago Routine medical exam    [facility], Erendira Campa MD    Office Visit    9 months ago Viral URI    [facility], [facility], Ayde Jackson APN    Office Visit          Future Appointments         Provider Department Appt Notes    In 1 month [facility] MRI 1 (1.5T WIDE) [facility]     In 4 months No Rodriguez APRN [facility] I really want to get my weight under control.                           Future Appointments         Provider Department Appt Notes    In 1 month [facility] MRI RM1 (1.5T WIDE) [facility]     In 4 months No Rodriguez APRN [facility] I really want to get my weight under control.          Recent  Outpatient Visits              Today Closed wedge compression fracture of T12 vertebra with delayed healing    [facility], Jens Baron MD    Office Visit    3 months ago Nasopharyngitis    [facility], Ayde Jackson APN    Office Visit    4 months ago Cerumen debris on tympanic membrane of both ears    [facility], Erendira Dooley MD    Office Visit    7 months ago Routine medical exam    [facility], Erendira Campa MD    Office Visit    9 months ago Viral URI    [facility], Ayde Jackson APN    Office Visit

## 2024-06-18 NOTE — H&P
NURSING INTAKE COMMENTS:   Chief Complaint   Patient presents with    Injury     Back - onset 24 when she fell from the garage roof - was in [facility] and she has x-rays and CT in the system - rates pain as 4-7/10 on and off        HPI: This 44 year old female presents today to be evaluated for T11 and T12 vertebral compression fractures that occurred from a fall off a garage roof at home 2024.  A patio umbrella had blown up on the roof and she was trying to get it and then fell.  She did bump her head but says she has no headache or neck pain.  Her pain is more of the lumbosacral junction than the thoracolumbar region.  She said the pain is much improved since she made the appointment.  She denies bowel or bladder problems, weakness, numbness or tingling, wrapping pains from her back to her groin or down the legs.  No perineal numbness.  She is pretty much back to most activities.  She even drove herself to the appointment today.    She lives independently with her family.  She has 2 kids ages 16 and 5.  She attends T-ball games with a 5-year-old and likes watching movies.  She works part-time as a  from her home.  Medical history is fairly noncontributory.    Past Medical History:    Anemia    with first pregnancy    Anxiety    Gestational diabetes (HCC)    Morbid obesity with BMI of 45.0-49.9, adult (HCC)    Tension headache    Varicella    Had chicken pox childhood     Past Surgical History:   Procedure Laterality Date      12/3/07;18    Other surgical history  11/10/2022    Vertical Sleeve Surgery     Current Outpatient Medications   Medication Sig Dispense Refill    calcitonin, salmon, 200 UNIT/ACT Nasal Solution 1 spray by Nasal route daily. Alternate nostrils daily.  Stop after 30 days. 1 each 0    Calcium 600-5 MG-MCG Oral Tab Take 1 tablet by mouth 2 (two) times daily with meals. 60 tablet 0    Multiple Vitamins-Minerals (CEROVITE SENIOR) Oral Tab Take 1 tablet by mouth  daily. 60 tablet 0    sertraline 100 MG Oral Tab Take 1 tablet (100 mg total) by mouth daily. 90 tablet 4    rizatriptan 10 MG Oral Tablet Dispersible Take 1 tablet (10 mg total) by mouth as needed for Migraine. 10 tablet 0    sertraline 50 MG Oral Tab Take 1.5 tablets (75 mg total) by mouth daily. 135 tablet 2     No Known Allergies  Family History   Problem Relation Age of Onset    Diabetes Mother         Type 2    Hypertension Father     Diabetes Father         Type 2    Breast Cancer Sister 39    Cancer Sister         Breast cancer    Diabetes Maternal Grandfather         Type 2    Breast Cancer Maternal Aunt         post    Breast Cancer Maternal Aunt         post     No family Hx of DVT/PE    Social History     Occupational History    Not on file   Tobacco Use    Smoking status: Never    Smokeless tobacco: Never   Vaping Use    Vaping status: Never Used   Substance and Sexual Activity    Alcohol use: Yes     Comment: Socially sometimes, a few times a year    Drug use: No    Sexual activity: Not on file        Review of Systems:  GENERAL: feels generally well, no significant weight loss or weight gain  SKIN: no ulcerated or worrisome skin lesions  EYES:denies blurred vision or double vision  HEENT: denies new nasal congestion, sinus pain or ST  LUNGS: denies shortness of breath  CARDIOVASCULAR: denies chest pain  GI: no hematemesis, no worsening heartburn, no diarrhea  : no dysuria, no blood in urine, no difficulty urinating, no incontinence  MUSCULOSKELETAL: no other musculoskeletal complaints other than in HPI  NEURO: no numbness or tingling, no weakness or balance disorder  PSYCHE: no depression or anxiety  HEMATOLOGIC: no hx of blood dyscrasia, no Hx DVT/PE  ENDOCRINE: no thyroid or diabetes issues  ALL/ASTHMA: no new hx of severe allergy or asthma    Physical Examination:    Ht 5' 2.4\" (1.585 m)   Wt 257 lb (116.6 kg)   LMP 05/22/2024   BMI 46.40 kg/m²   Constitutional: appears well hydrated, alert  and responsive, no acute distress noted  Extremities: She had an abrasion that was nearly healed at the right thoracolumbar region.  She had a resolving bruise at the superior aspect of the gluteal fold.  No hematomas or lacerations.  No deformities.  Musculoskeletal: Almost no tenderness to percussion of the lumbar spine.  She can easily toe walk, heel walk, squat fully, up, forward flex 80 degrees, and extend 20 degrees all fluidly.  Neurological: Normal motor and sensory bilateral lower extremities without deficits.  Denies perineal numbness or bowel or bladder deficits.    Imaging: CT scan showed the vertebral compression fractures without significant retropulsion.      CT SPINE THORACIC (CPT=72128)    Result Date: 6/7/2024  PROCEDURE: CT SPINE THORACIC (CPT=72128)  COMPARISON: [facility], XR THORACIC SPINE (3 VIEWS) (CPT=72072), 6/07/2024, 2:15 PM.  [facility], XR LUMBAR SPINE (MIN 2 VIEWS) (CPT=72100), 6/07/2024, 2:15 PM.  INDICATIONS: +fx noted on xray to t11&12, need to determine retropulsion. +fell off a garage roof yesterday, back pain  TECHNIQUE:   Multi-planar CT images were obtained without intravenous contrast.  Automated exposure control for dose reduction was used. Adjustment of the mA and/or kV was done based on the patient's size. Use of iterative reconstruction technique for dose reduction was used.  Dose information is transmitted to the ACR (American College of Radiology) NRDR (National Radiology Data Registry) which includes the Dose Index Registry.  FINDINGS:  PARASPINAL AREA: No mass.  No hematoma. BONES: Mild anterior wedging of the T11-T12 vertebral bodies when compared to the rest of the thoracic spine, which reflect age-indeterminate compression fractures.  No retropulsion.  No involvement of the posterior elements.  There is mild sclerosis involving the anterosuperior endplate of T11 and T12.  There are some areas of cortical irregularity involving the  inferior endplates of T11-T12, which are nonspecific.  No retropulsion.  No involvement of the posterior elements.  The remaining thoracic vertebral body heights are maintained. ALIGNMENT: The thoracic kyphosis is intact.  No listhesis. DISCS: Mild multilevel disc height loss.  There are few small ventral disc osteophyte complexes.  There is mild multilevel degenerative endplate change.  No canal stenosis.  No foraminal narrowing. OTHER:    The visualized lungs are clear.  No mediastinal lymphadenopathy.  There postoperative changes from prior gastric bypass.         CONCLUSION:   Mild anterior wedging of the T11 and T12 vertebral bodies, which reflect mild age-indeterminate compression fracture deformities.  No retropulsion or involvement of the posterior elements.  Mild multilevel degenerative change of the thoracic spine without significant canal stenosis or foraminal narrowing.  No traumatic listhesis.  Lesser incidental findings described above.    Dictated by (CST): Abdullahi Trejo MD on 6/07/2024 at 4:03 PM     Finalized by (CST): Abdullahi Trejo MD on 6/07/2024 at 4:17 PM          XR LUMBAR SPINE (MIN 2 VIEWS) (CPT=72100)    Result Date: 6/7/2024         PROCEDURE: XR LUMBAR SPINE (MIN 2 VIEWS) (CPT=72100)  COMPARISON: None.  INDICATIONS: Pain in middle of back bilaterally post fall from garage yesterday.  TECHNIQUE: Lumbar spine radiographs (2-3 views)   Findings and impression:  Normal lumbar alignment.  No lumbar compression fracture or significant disc space narrowing  Mild anterior wedge deformity at T11 and T12 again seen     Dictated by (CST): Angel Pat MD on 6/07/2024 at 2:39 PM     Finalized by (CST): Angel Pat MD on 6/07/2024 at 2:40 PM          XR THORACIC SPINE (3 VIEWS) (CPT=72072)    Result Date: 6/7/2024         PROCEDURE: XR THORACIC SPINE (3 VIEWS) (CPT=72072)  COMPARISON: [facility], XR LUMBAR SPINE (MIN 2 VIEWS) (CPT=72100), 6/07/2024, 2:15 PM.  INDICATIONS: Pain in  middle of back bilaterally post fall from garage yesterday.  TECHNIQUE: Thoracic spine radiographs (3 views)  Findings and impression:  Lateral view shows 20-25 percent anterior wedging of T11 and T12 consistent with mild compression fractures.  These are age indeterminate  No other compression fractures  Normal thoracic alignment with mild-to-moderate anterior spurs throughout the mid to lower thoracic spine     Dictated by (CST): Angel Pat MD on 6/07/2024 at 2:36 PM     Finalized by (CST): Angel Pat MD on 6/07/2024 at 2:39 PM          XR CHEST PA + LAT CHEST (CPT=71046)    Result Date: 6/7/2024         PROCEDURE: XR CHEST PA + LAT CHEST (CPT=71046)  COMPARISON: None.  INDICATIONS: Pain in middle of back bilaterally post fall from garage yesterday.  TECHNIQUE:   Two views.   Findings and impression:  Normal heart and mediastinum  Clear lungs  Normal pleura  No displaced fracture     Dictated by (CST): Angel Pat MD on 6/07/2024 at 2:36 PM     Finalized by (CST): Angel Pat MD on 6/07/2024 at 2:36 PM          [facility] GEOVANI 2D+3D SCREENING BILAT (CPT=77067/81513)    Result Date: 5/31/2024  PROCEDURE: [facility] GEOVANI 2D+3D SCREENING BILAT (17449/88511)  [facility] GEOVANI 2D+3D SCRN BILAT SELF REQUEST(HAS PCP)(CPT=77067/89710), 5/07/2022, 2:44 PM.  [facility] GEOVANI 2D+3D SCREENING BILAT ([id number]/39384), 5/30/2023, 1:03 PM.  INDICATIONS: Z12.31 Screening mammogram for breast cancer  TECHNIQUE:  Full field direct screening mammography was performed and images were reviewed with the [employer] PEDRO 1.5.1.5 CAD device.  3D tomosynthesis was performed and reviewed   BREAST COMPOSITION:   Category b-Scattered areas fibroglandular density.   FINDINGS: The parenchyma pattern is stable with no new suspicious asymmetry, mass, architectural distortion, or microcalcifications identified in either breast.    CONCLUSION:   No mammographic evidence for breast malignancy.  As long as the  patient's clinical breast exam remains unchanged, annual screening mammogram is recommended.  BI-RADS CATEGORY:   DIAGNOSTIC CATEGORY 1--NEGATIVE ASSESSMENT.   RECOMMENDATIONS:  ROUTINE MAMMOGRAM AND CLINICAL EVALUATION IN 12 MONTHS.    Your patient's answers to the health and family history questions collected during this mammogram indicate a potentially increased risk for breast cancer. It is recommended that this patient be evaluated in our Cancer Risk Assessment Clinic to determine eligibility for additional breast cancer screening, risk reduction strategies and/or genetic testing. Providers are encouraged to contact our breast navigator, Abi Berumen, at [phone] with any questions or for guidance regarding this recommendation.   PLEASE NOTE: NORMAL MAMMOGRAM DOES NOT EXCLUDE THE POSSIBILITY OF BREAST CANCER.  A CLINICALLY SUSPICIOUS PALPABLE LUMP SHOULD BE BIOPSIED.   For patients over the age of 40, the target due date for the patient's next mammogram has been entered into a reminder system.   Patient received a discharge summary from the technologist after completion of exam.  Breast marker legend used on images  Triangle = Palpable lump [place] = Skin tag or mole BB = Nipple Linear aida = Scar Square = Pain   Finalized by (CST): Dorinda Singleton MD on 5/31/2024 at 3:58 PM             Lab Results   Component Value Date    WBC 8.2 11/18/2023    HGB 13.8 11/18/2023    .0 11/18/2023      Lab Results   Component Value Date    GLU 95 11/18/2023    BUN 11 11/18/2023    CREATSERUM 0.60 11/18/2023    GFRNAA 116 09/07/2021    GFRAA 134 09/07/2021        Assessment and Plan:  Diagnoses and all orders for this visit:    Closed wedge compression fracture of T12 vertebra with delayed healing  -     calcitonin, salmon, 200 UNIT/ACT Nasal Solution; 1 spray by Nasal route daily. Alternate nostrils daily.  Stop after 30 days.  -     Calcium 600-5 MG-MCG Oral Tab; Take 1 tablet by mouth 2 (two) times daily  with meals.  -     Multiple Vitamins-Minerals (CEROVITE SENIOR) Oral Tab; Take 1 tablet by mouth daily.    Closed wedge compression fracture of T11 vertebra with delayed healing, subsequent encounter  -     calcitonin, salmon, 200 UNIT/ACT Nasal Solution; 1 spray by Nasal route daily. Alternate nostrils daily.  Stop after 30 days.  -     Calcium 600-5 MG-MCG Oral Tab; Take 1 tablet by mouth 2 (two) times daily with meals.  -     Multiple Vitamins-Minerals (CEROVITE SENIOR) Oral Tab; Take 1 tablet by mouth daily.        Assessment: She is scheduled to have MRI 7/27/2024.  She is much improved since she made the appointment.  She is about 2 weeks out from the injury.    Plan: Placed on Miacalcin spray, multivitamin once a day, and calcium plus vitamin D twice a day.  She may hold off on therapy since she seems to be much better and moving around almost normally.  She feels almost normal as well.  She will make an appoint with me in late July to go over the MRI if she still has pain.  I told her if she heals without any problems, she can cancel both the MRI and follow-up appoint with me.  We talked briefly about vertebral augmentation surgery but I do not think she will come to that.    Follow Up: No follow-ups on file.    Jens Copeland MD

## 2024-06-18 NOTE — TELEPHONE ENCOUNTER
Talked to patient offered her an appointment but she says she will make the appointment thru her MyChart.

## 2024-06-19 RX ORDER — RIZATRIPTAN BENZOATE 10 MG/1
10 TABLET, ORALLY DISINTEGRATING ORAL AS NEEDED
Qty: 10 TABLET | Refills: 1 | Status: SHIPPED | OUTPATIENT
Start: 2024-06-19

## 2024-08-21 RX ORDER — RIZATRIPTAN BENZOATE 10 MG/1
10 TABLET, ORALLY DISINTEGRATING ORAL AS NEEDED
Qty: 10 TABLET | Refills: 1 | Status: SHIPPED | OUTPATIENT
Start: 2024-08-21 | End: 2024-08-22

## 2024-08-21 NOTE — TELEPHONE ENCOUNTER
Refill passed per [facility] protocol.     Requested Prescriptions   Pending Prescriptions Disp Refills    RIZATRIPTAN 10 MG Oral Tablet Dispersible [Pharmacy Med Name: RIZATRIPTAN ODT 10MG TABLETS] 10 tablet 1     Sig: DISSOLVE ONE TABLET BY MOUTH AS NEEDED FOR MIGRAINE       Neurology Medications Passed - 8/19/2024  9:44 AM        Passed - In person appointment or virtual visit in the past 6 mos or appointment in next 3 mos     Recent Outpatient Visits              2 months ago Closed wedge compression fracture of T12 vertebra with delayed healing    [facility] Jens Copeland MD    Office Visit    5 months ago Nasopharyngitis    [facility]    Office Visit    7 months ago Cerumen debris on tympanic membrane of both [facility]    [facility] Visit    9 months ago [facility]    [facility] Visit    11 months ago [facility]al URI    [facility]    Office Visit          Future Appointments         Provider Department Appt Notes    Tomorrow Erendira Vera MD [facility] Medication follow up  ok to ext per Chuy    In 1 week [facility] MRI RM2 (3T WIDE) [facility] MRI     In 1 week Jens Copeland MD [facility] Back pain    In 2 months No Rodriguez APRN [facility] I really want to get my weight under control.                         @[facility] Center[facility]PRINTGRP@      @[email or web address]@

## 2024-08-28 ENCOUNTER — HOSPITAL ENCOUNTER (OUTPATIENT)
Dept: MRI IMAGING | Facility: HOSPITAL | Age: 44
Discharge: HOME | End: 2024-08-28
Attending: ORTHOPAEDIC SURGERY
Payer: COMMERCIAL

## 2024-08-28 DIAGNOSIS — S22.080G CLOSED WEDGE COMPRESSION FRACTURE OF T11 VERTEBRA WITH DELAYED HEALING, SUBSEQUENT ENCOUNTER: ICD-10-CM

## 2024-08-28 DIAGNOSIS — S22.080G CLOSED WEDGE COMPRESSION FRACTURE OF T12 VERTEBRA WITH DELAYED HEALING: ICD-10-CM

## 2024-08-28 PROCEDURE — 72146 MRI CHEST SPINE W/O DYE: CPT | Performed by: ORTHOPAEDIC SURGERY

## 2024-09-03 ENCOUNTER — OFFICE VISIT (OUTPATIENT)
Dept: ORTHOPEDICS CLINIC | Facility: CLINIC | Age: 44
End: 2024-09-03
Payer: COMMERCIAL

## 2024-09-03 DIAGNOSIS — S22.080G CLOSED WEDGE COMPRESSION FRACTURE OF T12 VERTEBRA WITH DELAYED HEALING: ICD-10-CM

## 2024-09-03 DIAGNOSIS — M54.50 THORACOLUMBAR BACK PAIN: ICD-10-CM

## 2024-09-03 DIAGNOSIS — S22.080G CLOSED WEDGE COMPRESSION FRACTURE OF T11 VERTEBRA WITH DELAYED HEALING, SUBSEQUENT ENCOUNTER: Primary | ICD-10-CM

## 2024-09-03 DIAGNOSIS — M54.6 THORACOLUMBAR BACK PAIN: ICD-10-CM

## 2024-09-03 PROCEDURE — 99213 OFFICE O/P EST LOW 20 MIN: CPT | Performed by: ORTHOPAEDIC SURGERY

## 2024-09-03 NOTE — PROGRESS NOTES
NURSING INTAKE COMMENTS:   Chief Complaint   Patient presents with    Back Pain     TS f/u and MRI results - states she is better but still has pain rated as 5-6/10 with certain movements        HPI: This 44 year old female presents today with persistent thoracolumbar pain without radiculopathy.  I saw her 3 months ago and at that time she had T11 and T12 vertebral compression fractures.  When she first saw me, she was improving.  We tried calcitonin spray, multivitamin, Citracal plus D.  She did improve somewhat but the pain has persisted with certain activities and that is what prompted today's visit.    She had MRI thoracic spine 2024.  I reviewed the films report.  I agree with the radiology reading.  The fractures are completely healed at this point and there is no edema in any bone.  She has mild degenerative disc disease in the form of decreased hydration but no significant loss of height.  Minimal bulging but no central or foraminal stenosis at any level.    Past Medical History:    Anemia    with first pregnancy    Anxiety    Gestational diabetes (HCC)    Morbid obesity with BMI of 45.0-49.9, adult (HCC)    Tension headache    Varicella    Had chicken pox childhood     Past Surgical History:   Procedure Laterality Date      12/3/07;18    Other surgical history  11/10/2022    Vertical Sleeve Surgery     Current Outpatient Medications   Medication Sig Dispense Refill    sertraline 50 MG Oral Tab Take 1 tablet (50 mg total) by mouth daily. Take with 100 mg      rizatriptan 10 MG Oral Tablet Dispersible Take 1 tablet (10 mg total) by mouth as needed for Migraine. 30 tablet 4    topiramate 25 MG Oral Tab Take 1 tablet (25 mg total) by mouth at bedtime. 90 tablet 4    ALPRAZolam (XANAX) 0.5 MG Oral Tab Take 1 tablet (0.5 mg total) by mouth 2 (two) times daily as needed for Anxiety. 10 tablet 0    calcitonin, salmon, 200 UNIT/ACT Nasal Solution 1 spray by Nasal route daily. Alternate nostrils daily.   Stop after 30 days. 1 each 0    Calcium 600-5 MG-MCG Oral Tab Take 1 tablet by mouth 2 (two) times daily with meals. 60 tablet 0    Multiple Vitamins-Minerals (CEROVITE SENIOR) Oral Tab Take 1 tablet by mouth daily. 60 tablet 0    sertraline 100 MG Oral Tab Take 1 tablet (100 mg total) by mouth daily. 90 tablet 4     No Known Allergies  Family History   Problem Relation Age of Onset    Diabetes Mother         Type 2    Hypertension Father     Diabetes Father         Type 2    Breast Cancer Sister 39    Cancer Sister         Breast cancer    Diabetes Maternal Grandfather         Type 2    Breast Cancer Maternal Aunt         post    Breast Cancer Maternal Aunt         post     No family Hx of DVT/PE    Social History     Occupational History    Not on file   Tobacco Use    Smoking status: Never    Smokeless tobacco: Never   Vaping Use    Vaping status: Never Used   Substance and Sexual Activity    Alcohol use: Yes     Comment: Socially sometimes, a few times a year    Drug use: No    Sexual activity: Not on file        Review of Systems:  GENERAL: feels generally well, no significant weight loss or weight gain  SKIN: no ulcerated or worrisome skin lesions  EYES:denies blurred vision or double vision  HEENT: denies new nasal congestion, sinus pain or ST  LUNGS: denies shortness of breath  CARDIOVASCULAR: denies chest pain  GI: no hematemesis, no worsening heartburn, no diarrhea  : no dysuria, no blood in urine, no difficulty urinating, no incontinence  MUSCULOSKELETAL: no other musculoskeletal complaints other than in HPI  NEURO: no numbness or tingling, no weakness or balance disorder  PSYCHE: no depression or anxiety  HEMATOLOGIC: no hx of blood dyscrasia, no Hx DVT/PE  ENDOCRINE: no thyroid or diabetes issues  ALL/ASTHMA: no new hx of severe allergy or asthma    Physical Examination:    [facility] 05/22/2024   Constitutional: appears well hydrated, alert and responsive, no acute distress noted  Extremities: BMI is 44.   No masses or deformities to the thoracic or lumbar spine.  Musculoskeletal: Today she can forward flex and touch her toes and extend 20 degrees.  She had no tenderness percussion of the thoracic, lumbar, or sacral spines.  Neurological: Normal motor and sensory bilateral lower extremities.    Imaging: MRI thoracic spine as below.  I agree with the radiology reading.      MRI SPINE THORACIC (CPT=72146)    Result Date: 8/29/2024  PROCEDURE: MRI SPINE THORACIC (CPT=72146)  COMPARISON: [facility], CT SPINE THORACIC (CPT=72128), 6/07/2024, 3:44 PM.  [facility], XR CHEST PA + LAT CHEST (CPT=71046), 6/07/2024, 2:15 PM.  [facility], XR THORACIC SPINE (3 VIEWS) (CPT=72072), 6/07/2024,  2:15 PM.  INDICATIONS: S22.080G Closed wedge compression fracture of T12 vertebra with delayed healing S22.080G Closed wedge compression fracture of T11 vertebra with delayed healing*  TECHNIQUE: A variety of imaging planes and parameters were utilized for visualization of suspected pathology.  Images were performed without contrast.   Counting reference: The first visualized rib is consider T1. There are 12 paired ribs.  FINDINGS:  PARASPINAL AREA: Normal with no visible mass.  BONES: Mild degenerative endplate changes seen throughout the thoracic spine. There is no acute fracture or dislocation.  Subtle compression deformity seen along the anterior endplates of T11-T12 without marrow edema. CORD: Normal caliber, contour, and signal intensity.  DISCS: Early degenerative disc disease is present without focal protrusion or neural impingement.  Small central disc protrusion at T7-T8 without significant canal or foraminal narrowing. OTHER: Negative.          CONCLUSION:   Chronic wedging of the anterior T11 and T12 vertebral bodies.  No marrow edema to suggest acute fracture.  No significant vertebral body height loss or posterior osseous retropulsion.  Minimal degenerative disc disease in the thoracic  spine without high-grade canal or foraminal narrowing.    Dictated by (CST): Dio Elliott MD on 8/29/2024 at 10:19 AM     Finalized by (CST): Dio Elliott MD on 8/29/2024 at 10:39 AM             Lab Results   Component Value Date    WBC 8.2 11/18/2023    HGB 13.8 11/18/2023    .0 11/18/2023      Lab Results   Component Value Date    GLU 95 11/18/2023    BUN 11 11/18/2023    CREATSERUM 0.60 11/18/2023    GFRNAA 116 09/07/2021    GFRAA 134 09/07/2021        Assessment and Plan:  Diagnoses and all orders for this visit:    Closed wedge compression fracture of T11 vertebra with delayed healing, subsequent encounter  -     PHYSICAL THERAPY - INTERNAL    Closed wedge compression fracture of T12 vertebra with delayed healing  -     PHYSICAL THERAPY - INTERNAL    Thoracolumbar back pain  -     PHYSICAL THERAPY - INTERNAL    BMI 45.0-49.9, adult ([facility])  -     DIETITIAN EDUCATION INITIAL, DIET (INTERNAL)        Assessment: Healed vertebral compression fractures.  Persistent thoracolumbar pain.    Plan: I recommended weight loss and physical therapy.  She agreed.  Dietitian consult was given and I also prescribed physical therapy.  She will do the home exercise program.  If her pain persist, we would send her to the pain center.  She does not need surgery.  I will see her as needed.    Follow Up: No follow-ups on file.    Jens Copeland MD

## 2024-09-16 ENCOUNTER — TELEPHONE (OUTPATIENT)
Dept: FAMILY MEDICINE CLINIC | Facility: CLINIC | Age: 44
End: 2024-09-16

## 2024-09-16 RX ORDER — RIZATRIPTAN BENZOATE 10 MG/1
10 TABLET, ORALLY DISINTEGRATING ORAL AS NEEDED
Qty: 30 TABLET | Refills: 4 | Status: SHIPPED | OUTPATIENT
Start: 2024-09-16 | End: 2024-12-15

## 2024-09-16 NOTE — TELEPHONE ENCOUNTER
Dr Vera, please advise    Eleazar is calling for clarification for the Rizatriptan medication. The pharmacy needs to know the maximum amount patient can take in a 24 hour period?    Triage RN-Call Laura Sapiens-Mail order at  [phone]  Reference #-[id number]    Medication Quantity Refills Start End   rizatriptan 10 MG Oral Tablet Dispersible 30 tablet 4 8/22/2024 11/20/2024   Sig:   Take 1 tablet (10 mg total) by mouth as needed for Migraine.

## 2024-10-16 ENCOUNTER — TELEPHONE (OUTPATIENT)
Dept: SURGERY | Facility: CLINIC | Age: 44
End: 2024-10-16

## 2024-10-18 NOTE — TELEPHONE ENCOUNTER
Please review. Protocol Pass     Rx is pended, is refill appropriate?         Requested Prescriptions   Pending Prescriptions Disp Refills    SERTRALINE 50 MG Oral Tab [Pharmacy Med Name: SERTRALINE 50MG TABLETS] 135 tablet 0     Sig: TAKE 1 AND 1/2 TABLETS(75 MG) BY MOUTH DAILY       Psychiatric Non-Scheduled (Anti-Anxiety) Passed - 10/18/2024  4:18 PM        Passed - In person appointment or virtual visit in the past 6 mos or appointment in next 3 mos     Recent Outpatient Visits              1 month ago Closed wedge compression fracture of T11 vertebra with delayed healing, [facility]    [facility]    1 month ago Anxiety    [facility], Erendira Campa MD    Office Visit    4 months ago Closed wedge compression fracture of T12 vertebra with delayed healing    [facility]    [facility]    7 months ago Nasopharyngitis    [facility]    Office Visit    8 months ago Cerumen debris on tympanic membrane of both ears    [facility], Erendira Dooley MD    Office Visit          Future Appointments         Provider Department Appt Notes    In 3 days No Rodriguez APRN [facility] I really want to get my weight under control.                    Passed - Depression Screening completed within the past 12 months               Future Appointments         Provider Department Appt Notes    [facility] I really want to get my weight under control.          Recent Outpatient Visits              1 month ago Closed wedge compression fracture of T11 vertebra with delayed healing, subsequent encounter    [facility]  [facility], [facility] [place]    Office Visit    1 month ago Anxiety    [facility], Erendira Campa MD    Office Visit    4 months ago Closed wedge compression fracture of T12 vertebra with delayed healing    [facility], [facility], Jens Baron MD    Office Visit    7 months ago Nasopharyngitis    [facility], [facility], [facility], Ayde Jackson APN    Office Visit    8 months ago Cerumen debris on tympanic membrane of both ears    [facility], Erendira Dooley MD    Office Visit

## 2024-10-21 ENCOUNTER — TELEMEDICINE (OUTPATIENT)
Dept: SURGERY | Facility: CLINIC | Age: 44
End: 2024-10-21
Payer: COMMERCIAL

## 2024-10-21 ENCOUNTER — TELEPHONE (OUTPATIENT)
Dept: SURGERY | Facility: CLINIC | Age: 44
End: 2024-10-21

## 2024-10-21 VITALS — WEIGHT: 259 LBS | BODY MASS INDEX: 47 KG/M2

## 2024-10-21 DIAGNOSIS — Z90.3 HISTORY OF SLEEVE GASTRECTOMY: ICD-10-CM

## 2024-10-21 DIAGNOSIS — E78.5 DYSLIPIDEMIA: Primary | ICD-10-CM

## 2024-10-21 DIAGNOSIS — Z86.32 HISTORY OF GESTATIONAL DIABETES: ICD-10-CM

## 2024-10-21 DIAGNOSIS — E66.01 MORBID OBESITY WITH BMI OF 45.0-49.9, ADULT (HCC): ICD-10-CM

## 2024-10-21 DIAGNOSIS — E55.9 VITAMIN D DEFICIENCY: ICD-10-CM

## 2024-10-21 DIAGNOSIS — L30.4 INTERTRIGO: ICD-10-CM

## 2024-10-21 DIAGNOSIS — Z51.81 ENCOUNTER FOR THERAPEUTIC DRUG MONITORING: ICD-10-CM

## 2024-10-21 DIAGNOSIS — Z86.69 HX OF MIGRAINES: ICD-10-CM

## 2024-10-21 PROCEDURE — 99213 OFFICE O/P EST LOW 20 MIN: CPT | Performed by: NURSE PRACTITIONER

## 2024-10-21 RX ORDER — KETOCONAZOLE 20 MG/G
1 CREAM TOPICAL 2 TIMES DAILY
Qty: 60 G | Refills: 5 | Status: SHIPPED | OUTPATIENT
Start: 2024-10-21

## 2024-10-21 RX ORDER — SEMAGLUTIDE 0.25 MG/.5ML
0.25 INJECTION, SOLUTION SUBCUTANEOUS WEEKLY
Qty: 4 EACH | Refills: 1 | Status: SHIPPED | OUTPATIENT
Start: 2024-10-21

## 2024-10-21 NOTE — PROGRESS NOTES
Virtual Video & Audio Check-In    Hetal Weston verbally consents to a Virtual Check-In visit on 10/21/24.  Patient has been referred to the [facility] website at [email or web address] to review the yearly Consent to Treat document.    Patient understands and accepts financial responsibility for any deductible, co-insurance and/or co-pays associated with this service.    Duration of the service: 32 minutes    Summary of topics discussed: Obesity/weight management, Lifestyle and behavior modifications, Medication management, Bariatric surgery     [facility]  [address]  [place] [address]  Dept: [phone]    Patient:  Hetal Weston  :      1980  MRN:      [id number]    Referring Provider: David     Chief Complaint:     Chief Complaint   Patient presents with    Follow - Up    Post-Op    Obesity    Weight Management     Date of Surgery:     Surgery Type:   Sleeve gastrectomy   Surgery was in [place]   lbs   lbs   lbs, regaining     Subjective       43 yo female.  Presents to clinic for assistance with weight loss/maintenance.   Hx sleeve gastrectomy with weight regain.  Reports ongoing back pain- fell off parents' garage roof in 2024.  Referred by Dr. Hernandez.     Satiety:  Positive    Food Intake:    2-3 meals/day  Aims for adequate protein    Fluid Intake:  Aims for 32 oz  Small sips, has difficulty with water since surgery    Protein Intake:    Aim for 90-95 grams protein/day    Vitamin:  Yes   MVI, calcium    Exercise: Yes walks, limited by back pain  Planning to start PT    Sleep: interrupted due to mind racing- improved with topiramate    Comorbidities:  None    Occupation:   Lives with: , 2 kids  Previous weight loss medication: metformin, phentermine     Objective     Vitals: Wt 259 lb (117.5 kg)   LMP 2024   BMI 46.77 kg/m²     Starting weight: 314   Current  weight:  259   Interval weight loss:    Total weight loss:  -55   GW under 200 lbs     Last 3 Weights   10/21/24 1111 259 lb (117.5 kg)   24 1036 259 lb 9.6 oz (117.8 kg)   24 0848 257 lb (116.6 kg)       Patient Medications:    Current Outpatient Medications:     semaglutide-weight management (WEGOVY) 0.25 MG/0.5ML Subcutaneous Solution Auto-injector, Inject 0.5 mL (0.25 mg total) into the skin once a week., Disp: 4 each, Rfl: 1    ketoconazole 2 % External Cream, Apply 1 Application topically 2 (two) times daily., Disp: 60 g, Rfl: 5    rizatriptan 10 MG Oral Tablet Dispersible, Take 1 tablet (10 mg total) by mouth as needed for Migraine. Can repeat 2 hours later if symptoms persist. Max 30 mg per day., Disp: 30 tablet, Rfl: 4    sertraline 50 MG Oral Tab, Take 0.5 tablets (25 mg total) by mouth daily., Disp: 135 tablet, Rfl: 4    topiramate 25 MG Oral Tab, Take 1 tablet (25 mg total) by mouth at bedtime., Disp: 90 tablet, Rfl: 4    calcitonin, salmon, 200 UNIT/ACT Nasal Solution, 1 spray by Nasal route daily. Alternate nostrils daily.  Stop after 30 days., Disp: 1 each, Rfl: 0    Calcium 600-5 MG-MCG Oral Tab, Take 1 tablet by mouth 2 (two) times daily with meals., Disp: 60 tablet, Rfl: 0    Multiple Vitamins-Minerals (CEROVITE SENIOR) Oral Tab, Take 1 tablet by mouth daily., Disp: 60 tablet, Rfl: 0    sertraline 100 MG Oral Tab, Take 1 tablet (100 mg total) by mouth daily., Disp: 90 tablet, Rfl: 4    Allergies:  Patient has no known allergies.     Social History:  Reviewed     Surgical History:    Past Surgical History:   Procedure Laterality Date      12/3/07;18    Other surgical history  11/10/2022    Vertical Sleeve Surgery       Family History:    Family History   Problem Relation Age of Onset    Diabetes Mother         Type 2    Hypertension Father     Diabetes Father         Type 2    Breast Cancer Sister 39    Cancer Sister         Breast cancer    Diabetes Maternal Grandfather          Type 2    Breast Cancer Maternal Aunt         post    Breast Cancer Maternal Aunt         post       Physical Exam:  General: alert, oriented x 3, cooperative, speaking in full sentences, appears stated age and cooperative, obese   Head: Normocephalic, without obvious abnormality, atraumatic  Neck: symmetrical, trachea midline   Lungs: No increased work of breathing   Extremities: extremities normal  Skin: Upper body skin color and texture appear intact       ROS:    Constitutional: positive for fatigue  Respiratory: negative  Cardiovascular: negative  Gastrointestinal: negative  Genitourinary:negative  Integument/breast: positive for rash and under pannus and axillae  Hematologic/lymphatic: negative  Musculoskeletal:positive for back pain  Neurological: positive for headaches  Behavioral/Psych: positive for anxiety and depression  Endocrine: negative  All other systems were reviewed and are negative    Assessment       Encounter Diagnosis(ses):   Encounter Diagnoses   Name Primary?    Dyslipidemia Yes    Encounter for therapeutic drug monitoring     History of gestational diabetes     Hx of migraines     History of sleeve gastrectomy     Morbid obesity with BMI of 45.0-49.9, adult (HCC)     Intertrigo     Vitamin D deficiency        Plan     Date of Surgery:   2022  Surgery Type:   Sleeve gastrectomy   Surgery was in [place]   lbs   lbs   lbs, regaining     Intertrigo:  Apply medication to affected area BID.  Keep area dry and clean.    OBESITY:     Doing well post operatively.     Advised patient to continue to avoid pop, smoking, caffeine, and carbonated drinks.     Aim for 3 meals per day- eat protein first, followed by soft vegetables, fruits, and whole grains- must measure food.   Include 1-2 snacks per day as needed and to help meet protein needs throughout the day.      Exercise- aim for 150 minutes moderate level walking/week as tolerated.  Incorporate strength training 2-3 days/week.      Aim for adequate water intake > 64 oz/day.     Labs done 11/18/23.  Update fasting bariatric labs next month.      Meet with RD.    Switch to bariatric vitamin.     Denies personal or family hx medullary thyroid CA, endocrine neoplasia syndrome, pancreatitis hx, suicidal ideation. No renal impairment, severe GI disease, diabetes, pancreatitis risks noted.    Continue topiramate (ordered by pcp).  Hx migraines.    Will check Wegovy and Zepbound coverage.   Start Wegovy 0.25 mg weekly.   Increase dose monthly as tolerated.    SQ administration teaching provided to patient.   Discussed risks, benefits, and side effects of medication. Avoid pregnancy during use. Contraindications for medication discussed at length. Patient states understanding.     I spent 30 minutes preparing chart, obtaining/reviewing pertinent history, performing medically appropriate examination/evaluations, counseling/educating on assessment and plan, ordering and reviewing tests/medications as needed, referring/communicating with other health care professionals as needed, documenting clinical information, interpreting results, communicating results, and/or coordinating patient care.     RTC 2 months.      DEEPTI Treadwell

## 2024-10-23 ENCOUNTER — TELEPHONE (OUTPATIENT)
Dept: SURGERY | Facility: CLINIC | Age: 44
End: 2024-10-23

## 2024-10-29 ENCOUNTER — TELEPHONE (OUTPATIENT)
Dept: SURGERY | Facility: CLINIC | Age: 44
End: 2024-10-29

## 2024-11-05 ENCOUNTER — TELEPHONE (OUTPATIENT)
Dept: SURGERY | Facility: CLINIC | Age: 44
End: 2024-11-05

## 2024-11-06 DIAGNOSIS — E66.01 MORBID OBESITY WITH BMI OF 45.0-49.9, ADULT (HCC): Primary | ICD-10-CM

## 2024-11-06 RX ORDER — TIRZEPATIDE 2.5 MG/.5ML
2.5 INJECTION, SOLUTION SUBCUTANEOUS WEEKLY
Qty: 2 ML | Refills: 2 | Status: SHIPPED | OUTPATIENT
Start: 2024-11-06

## 2024-12-20 DIAGNOSIS — E66.01 MORBID OBESITY WITH BMI OF 45.0-49.9, ADULT (HCC): Primary | ICD-10-CM

## 2024-12-20 RX ORDER — TIRZEPATIDE 5 MG/.5ML
5 INJECTION, SOLUTION SUBCUTANEOUS WEEKLY
Qty: 2 ML | Refills: 2 | Status: SHIPPED | OUTPATIENT
Start: 2024-12-20

## 2024-12-21 ENCOUNTER — LAB ENCOUNTER (OUTPATIENT)
Dept: LAB | Age: 44
End: 2024-12-21
Attending: NURSE PRACTITIONER
Payer: COMMERCIAL

## 2024-12-21 DIAGNOSIS — L30.4 INTERTRIGO: ICD-10-CM

## 2024-12-21 DIAGNOSIS — E55.9 VITAMIN D DEFICIENCY: ICD-10-CM

## 2024-12-21 DIAGNOSIS — Z86.32 HISTORY OF GESTATIONAL DIABETES: ICD-10-CM

## 2024-12-21 DIAGNOSIS — Z86.69 HX OF MIGRAINES: ICD-10-CM

## 2024-12-21 DIAGNOSIS — E66.01 MORBID OBESITY WITH BMI OF 45.0-49.9, ADULT (HCC): ICD-10-CM

## 2024-12-21 DIAGNOSIS — Z90.3 HISTORY OF SLEEVE GASTRECTOMY: ICD-10-CM

## 2024-12-21 DIAGNOSIS — E78.5 DYSLIPIDEMIA: ICD-10-CM

## 2024-12-21 DIAGNOSIS — Z51.81 ENCOUNTER FOR THERAPEUTIC DRUG MONITORING: ICD-10-CM

## 2024-12-21 LAB
ALBUMIN SERPL-MCNC: 4.2 G/DL (ref 3.2–4.8)
ALBUMIN/GLOB SERPL: 1.8 {RATIO} (ref 1–2)
ALP LIVER SERPL-CCNC: 76 U/L
ALT SERPL-CCNC: 17 U/L
ANION GAP SERPL CALC-SCNC: 8 MMOL/L (ref 0–18)
AST SERPL-CCNC: 14 U/L (ref ?–34)
BASOPHILS # BLD AUTO: 0.06 X10(3) UL (ref 0–0.2)
BASOPHILS NFR BLD AUTO: 0.7 %
BILIRUB SERPL-MCNC: 0.8 MG/DL (ref 0.3–1.2)
BUN BLD-MCNC: 13 MG/DL (ref 9–23)
BUN/CREAT SERPL: 18.8 (ref 10–20)
CALCIUM BLD-MCNC: 9.1 MG/DL (ref 8.7–10.4)
CHLORIDE SERPL-SCNC: 109 MMOL/L (ref 98–112)
CHOLEST SERPL-MCNC: 200 MG/DL (ref ?–200)
CO2 SERPL-SCNC: 27 MMOL/L (ref 21–32)
CREAT BLD-MCNC: 0.69 MG/DL
DEPRECATED HBV CORE AB SER IA-ACNC: 45 NG/ML
DEPRECATED RDW RBC AUTO: 42.5 FL (ref 35.1–46.3)
EGFRCR SERPLBLD CKD-EPI 2021: 111 ML/MIN/1.73M2 (ref 60–?)
EOSINOPHIL # BLD AUTO: 0.13 X10(3) UL (ref 0–0.7)
EOSINOPHIL NFR BLD AUTO: 1.6 %
ERYTHROCYTE [DISTWIDTH] IN BLOOD BY AUTOMATED COUNT: 13.5 % (ref 11–15)
EST. AVERAGE GLUCOSE BLD GHB EST-MCNC: 100 MG/DL (ref 68–126)
FASTING PATIENT LIPID ANSWER: YES
FASTING STATUS PATIENT QL REPORTED: YES
FOLATE SERPL-MCNC: 10 NG/ML (ref 5.4–?)
GLOBULIN PLAS-MCNC: 2.4 G/DL (ref 2–3.5)
GLUCOSE BLD-MCNC: 94 MG/DL (ref 70–99)
HBA1C MFR BLD: 5.1 % (ref ?–5.7)
HCT VFR BLD AUTO: 40.5 %
HDLC SERPL-MCNC: 35 MG/DL (ref 40–59)
HGB BLD-MCNC: 13.8 G/DL
IMM GRANULOCYTES # BLD AUTO: 0.03 X10(3) UL (ref 0–1)
IMM GRANULOCYTES NFR BLD: 0.4 %
IRON SATN MFR SERPL: 22 %
IRON SERPL-MCNC: 62 UG/DL
LDLC SERPL CALC-MCNC: 138 MG/DL (ref ?–100)
LYMPHOCYTES # BLD AUTO: 2.69 X10(3) UL (ref 1–4)
LYMPHOCYTES NFR BLD AUTO: 32.3 %
MCH RBC QN AUTO: 29.5 PG (ref 26–34)
MCHC RBC AUTO-ENTMCNC: 34.1 G/DL (ref 31–37)
MCV RBC AUTO: 86.5 FL
MONOCYTES # BLD AUTO: 0.45 X10(3) UL (ref 0.1–1)
MONOCYTES NFR BLD AUTO: 5.4 %
NEUTROPHILS # BLD AUTO: 4.96 X10 (3) UL (ref 1.5–7.7)
NEUTROPHILS # BLD AUTO: 4.96 X10(3) UL (ref 1.5–7.7)
NEUTROPHILS NFR BLD AUTO: 59.6 %
NONHDLC SERPL-MCNC: 165 MG/DL (ref ?–130)
OSMOLALITY SERPL CALC.SUM OF ELEC: 298 MOSM/KG (ref 275–295)
PLATELET # BLD AUTO: 309 10(3)UL (ref 150–450)
POTASSIUM SERPL-SCNC: 4 MMOL/L (ref 3.5–5.1)
PROT SERPL-MCNC: 6.6 G/DL (ref 5.7–8.2)
RBC # BLD AUTO: 4.68 X10(6)UL
SODIUM SERPL-SCNC: 144 MMOL/L (ref 136–145)
T4 FREE SERPL-MCNC: 0.9 NG/DL (ref 0.8–1.7)
TIBC SERPL-MCNC: 276 UG/DL (ref 250–425)
TRANSFERRIN SERPL-MCNC: 185 MG/DL (ref 250–380)
TRIGL SERPL-MCNC: 149 MG/DL (ref 30–149)
TSI SER-ACNC: 0.73 UIU/ML (ref 0.55–4.78)
VIT B12 SERPL-MCNC: 503 PG/ML (ref 211–911)
VIT D+METAB SERPL-MCNC: 25.3 NG/ML (ref 30–100)
VLDLC SERPL CALC-MCNC: 28 MG/DL (ref 0–30)
WBC # BLD AUTO: 8.3 X10(3) UL (ref 4–11)

## 2024-12-21 PROCEDURE — 80050 GENERAL HEALTH PANEL: CPT | Performed by: NURSE PRACTITIONER

## 2024-12-21 PROCEDURE — 84439 ASSAY OF FREE THYROXINE: CPT | Performed by: NURSE PRACTITIONER

## 2024-12-21 PROCEDURE — 84425 ASSAY OF VITAMIN B-1: CPT | Performed by: NURSE PRACTITIONER

## 2024-12-21 PROCEDURE — 82306 VITAMIN D 25 HYDROXY: CPT | Performed by: NURSE PRACTITIONER

## 2024-12-21 PROCEDURE — 83540 ASSAY OF IRON: CPT | Performed by: NURSE PRACTITIONER

## 2024-12-21 PROCEDURE — 82607 VITAMIN B-12: CPT | Performed by: NURSE PRACTITIONER

## 2024-12-21 PROCEDURE — 84466 ASSAY OF TRANSFERRIN: CPT | Performed by: NURSE PRACTITIONER

## 2024-12-21 PROCEDURE — 83036 HEMOGLOBIN GLYCOSYLATED A1C: CPT | Performed by: NURSE PRACTITIONER

## 2024-12-21 PROCEDURE — 80061 LIPID PANEL: CPT | Performed by: NURSE PRACTITIONER

## 2024-12-21 PROCEDURE — 82746 ASSAY OF FOLIC ACID SERUM: CPT | Performed by: NURSE PRACTITIONER

## 2024-12-21 PROCEDURE — 82728 ASSAY OF FERRITIN: CPT | Performed by: NURSE PRACTITIONER

## 2024-12-26 ENCOUNTER — OFFICE VISIT (OUTPATIENT)
Dept: FAMILY MEDICINE CLINIC | Facility: CLINIC | Age: 44
End: 2024-12-26
Payer: COMMERCIAL

## 2024-12-26 VITALS
HEART RATE: 68 BPM | SYSTOLIC BLOOD PRESSURE: 119 MMHG | WEIGHT: 253.19 LBS | DIASTOLIC BLOOD PRESSURE: 80 MMHG | BODY MASS INDEX: 46 KG/M2 | HEIGHT: 62.4 IN

## 2024-12-26 DIAGNOSIS — Z00.00 ROUTINE MEDICAL EXAM: ICD-10-CM

## 2024-12-26 DIAGNOSIS — E55.9 VITAMIN D DEFICIENCY: ICD-10-CM

## 2024-12-26 DIAGNOSIS — F41.9 ANXIETY: ICD-10-CM

## 2024-12-26 DIAGNOSIS — Z90.3 HISTORY OF SLEEVE GASTRECTOMY: Primary | ICD-10-CM

## 2024-12-26 DIAGNOSIS — Z12.31 SCREENING MAMMOGRAM FOR BREAST CANCER: ICD-10-CM

## 2024-12-26 DIAGNOSIS — E78.5 DYSLIPIDEMIA: ICD-10-CM

## 2024-12-26 RX ORDER — SENNOSIDES 8.6 MG
8.6 TABLET ORAL 2 TIMES DAILY
Qty: 60 TABLET | Refills: 4 | Status: SHIPPED | OUTPATIENT
Start: 2024-12-26

## 2024-12-26 RX ORDER — SERTRALINE HYDROCHLORIDE 100 MG/1
100 TABLET, FILM COATED ORAL DAILY
Qty: 90 TABLET | Refills: 4 | Status: SHIPPED | OUTPATIENT
Start: 2024-12-26

## 2024-12-26 NOTE — PROGRESS NOTES
HPI:   Hetal Weston is a 44 year old female who presents for a complete physical exam.    Had labs done with No Rodriguez from weight loss clinic. Started on zepbound 2.5 for 8 weeks and will start 5 mg on 9th week.   Does have vitamin D and will restart it.   Reports trying to get back to exercise. Reports having more energy now. - just needs to add exercise also.   Wt Readings from Last 3 Encounters:   24 253 lb 3.2 oz (114.9 kg)   10/21/24 259 lb (117.5 kg)   24 259 lb 9.6 oz (117.8 kg)     Body mass index is 45.72 kg/m².       Current Outpatient Medications   Medication Sig Dispense Refill    sennosides 8.6 MG Oral Tab Take 1 tablet (8.6 mg total) by mouth 2 (two) times daily. 60 tablet 4    sertraline 50 MG Oral Tab Take 1 tablet (50 mg total) by mouth daily. 90 tablet 4    sertraline 100 MG Oral Tab Take 1 tablet (100 mg total) by mouth daily. 90 tablet 4    Tirzepatide-Weight Management (ZEPBOUND) 5 MG/0.5ML Subcutaneous Solution Inject 5 mg into the skin once a week. 2 mL 2    Tirzepatide-Weight Management (ZEPBOUND) 2.5 MG/0.5ML Subcutaneous Solution Inject 2.5 mg into the skin once a week. 2 mL 2    ketoconazole 2 % External Cream Apply 1 Application topically 2 (two) times daily. 60 g 5    topiramate 25 MG Oral Tab Take 1 tablet (25 mg total) by mouth at bedtime. 90 tablet 4      Past Medical History:    Anemia    with first pregnancy    Anxiety    Gestational diabetes (HCC)    Morbid obesity with BMI of 45.0-49.9, adult (HCC)    Tension headache    Varicella    Had chicken pox childhood      Past Surgical History:   Procedure Laterality Date      12/3/07;18    Other surgical history  11/10/2022    Vertical Sleeve Surgery      Family History   Problem Relation Age of Onset    Diabetes Mother         Type 2    Hypertension Father     Diabetes Father         Type 2    Breast Cancer Sister 39    Cancer Sister         Breast cancer    Diabetes Maternal Grandfather         Type 2     Breast Cancer Maternal Aunt         post    Breast Cancer Maternal Aunt         post      Social History:   Social History     Socioeconomic History    Marital status:    Tobacco Use    Smoking status: Never    Smokeless tobacco: Never   Vaping Use    Vaping status: Never Used   Substance and Sexual Activity    Alcohol use: Yes     Comment: Socially sometimes, a few times a year    Drug use: No   Other Topics Concern    Caffeine Concern No     Comment: Coffee/Soda          REVIEW OF SYSTEMS:   GENERAL: feels well otherwise  Review of Systems   See HPI   EXAM:   /80   Pulse 68   Ht 5' 2.4\" (1.585 m)   Wt 253 lb 3.2 oz (114.9 kg)   LMP 12/16/2024   BMI 45.72 kg/m²     GENERAL: well developed, well nourished,in no apparent distress  SKIN: no rashes,no suspicious lesions  HEENT: atraumatic, normocephalic,ears and throat are clear  EYES: EOMI, conjunctiva are clear  LUNGS: clear to auscultation  CARDIO: RRR without murmur  GI: good BS's,no masses, HSM or tenderness  EXTREMITIES: no cyanosis, clubbing or edema  NEURO: Oriented times three,cranial nerves are intact,motor and sensory are grossly intact    ASSESSMENT AND PLAN:   Hetal Weston is a 44 year old female who presents for a complete physical exam.    1. History of sleeve gastrectomy      2. Anxiety  Sertraline is at 150mg - stable     3. Routine medical exam      4. Dyslipidemia      5. Vitamin D deficiency      6. Screening mammogram for breast cancer    - [facility] GEOVANI 2D+3D SCREENING BILAT (CPT=77067/34311); Future      7. BMI 45.0-49.9, adult (HCC)  Started zepbound. Did lose 50 lbs after sleeve gastrectomy.     Erendira Vera MD  12/26/2024  1:35 PM

## 2024-12-29 LAB — VITAMIN B1 WHOLE BLD: 100.3 NMOL/L

## 2025-01-30 DIAGNOSIS — E55.9 VITAMIN D DEFICIENCY: Primary | ICD-10-CM

## 2025-02-21 ENCOUNTER — OFFICE VISIT (OUTPATIENT)
Dept: SURGERY | Facility: CLINIC | Age: 45
End: 2025-02-21
Payer: COMMERCIAL

## 2025-02-21 VITALS
HEART RATE: 80 BPM | WEIGHT: 245 LBS | HEIGHT: 65.2 IN | DIASTOLIC BLOOD PRESSURE: 80 MMHG | SYSTOLIC BLOOD PRESSURE: 110 MMHG | OXYGEN SATURATION: 99 % | BODY MASS INDEX: 40.33 KG/M2

## 2025-02-21 DIAGNOSIS — L30.4 INTERTRIGO: ICD-10-CM

## 2025-02-21 DIAGNOSIS — E78.5 DYSLIPIDEMIA: Primary | ICD-10-CM

## 2025-02-21 DIAGNOSIS — E66.01 MORBID OBESITY WITH BMI OF 45.0-49.9, ADULT (HCC): ICD-10-CM

## 2025-02-21 DIAGNOSIS — Z90.3 HISTORY OF SLEEVE GASTRECTOMY: ICD-10-CM

## 2025-02-21 DIAGNOSIS — E55.9 VITAMIN D DEFICIENCY: ICD-10-CM

## 2025-02-21 DIAGNOSIS — Z86.69 HX OF MIGRAINES: ICD-10-CM

## 2025-02-21 DIAGNOSIS — Z51.81 ENCOUNTER FOR THERAPEUTIC DRUG MONITORING: ICD-10-CM

## 2025-02-21 PROCEDURE — 99213 OFFICE O/P EST LOW 20 MIN: CPT | Performed by: NURSE PRACTITIONER

## 2025-02-21 PROCEDURE — 3008F BODY MASS INDEX DOCD: CPT | Performed by: NURSE PRACTITIONER

## 2025-02-21 PROCEDURE — 3074F SYST BP LT 130 MM HG: CPT | Performed by: NURSE PRACTITIONER

## 2025-02-21 PROCEDURE — 3079F DIAST BP 80-89 MM HG: CPT | Performed by: NURSE PRACTITIONER

## 2025-02-21 RX ORDER — TOPIRAMATE 25 MG/1
25 TABLET, FILM COATED ORAL 2 TIMES DAILY
Qty: 180 TABLET | Refills: 0 | Status: SHIPPED | OUTPATIENT
Start: 2025-02-21

## 2025-02-21 RX ORDER — TIRZEPATIDE 5 MG/.5ML
INJECTION, SOLUTION SUBCUTANEOUS
Qty: 2 ML | Refills: 2 | Status: SHIPPED | OUTPATIENT
Start: 2025-02-21

## 2025-02-21 NOTE — PATIENT INSTRUCTIONS
Stop senokot.    Start Doctor's Best powder with water in the evening for constipation and sleep.     Add kiwi daily.    Increase water intake.     If no improvement take miralax every 24 hours to at least 5 healthy bowel movements/week.    Consider daily stool softener or organic senna tea a few days/week.

## 2025-02-21 NOTE — PROGRESS NOTES
[facility], [facility], [facility]  [address]  [place] [address]  Dept: [phone]    Patient:  Hetal Weston  :      1980  MRN:      [id number]    Referring Provider: David     Chief Complaint:     Chief Complaint   Patient presents with    Follow - Up    Weight Management     Date of Surgery:     Surgery Type:   Sleeve gastrectomy   Surgery was in [place]   lbs   lbs   lbs, regaining     Subjective     Tolerating Zepbound.  Constipated.  Now on daily stool softener and bid senokot without relief.    Satiety:  Positive    Food Intake:    2-3 meals/day  Apples, grapes, add 1 kiwi/day  Aims for adequate protein  Tracks food intake     Fluid Intake:    Increase to 64 oz/day  Small sips, has difficulty with water since surgery    Protein Intake:    Aim for 90 grams protein/day    Vitamin:  Yes   MVI, calcium    Exercise: Yes walks, limited by back pain  Planning to start PT    Sleep: Interrupted due to mind racing- improved with topiramate    Comorbidities:  None    Initial HPI:  43 yo female.  Presents to clinic for assistance with weight loss/maintenance.   Hx sleeve gastrectomy with weight regain.  Reports ongoing back pain- fell off parents' garage roof in 2024.  Referred by Dr. Hernandez.     Occupation:   Lives with: , 2 kids  Previous weight loss medication: metformin, phentermine     Objective     Vitals: /80   Pulse 80   Ht 5' 5.2\" (1.656 m)   Wt 245 lb (111.1 kg)   LMP 2024   SpO2 99%   BMI 40.52 kg/m²     Starting weight: 314   Current weight:  245   Interval weight loss: -14   Total weight loss:  -69   GW under 200 lbs     Last 3 Weights   25 1027 245 lb (111.1 kg)   24 1322 253 lb 3.2 oz (114.9 kg)   10/21/24 1111 259 lb (117.5 kg)       Patient Medications:    Current Outpatient Medications:     topiramate 25 MG Oral Tab, Take 1 tablet (25 mg total) by  mouth 2 (two) times daily., Disp: 180 tablet, Rfl: 0    cholecalciferol 1.25 MG (60167 UT) Oral Cap, Take 1 capsule (1.25 mg total) by mouth every 7 days., Disp: 4 capsule, Rfl: 3    sennosides 8.6 MG Oral Tab, Take 1 tablet (8.6 mg total) by mouth 2 (two) times daily., Disp: 60 tablet, Rfl: 4    sertraline 50 MG Oral Tab, Take 1 tablet (50 mg total) by mouth daily., Disp: 90 tablet, Rfl: 4    sertraline 100 MG Oral Tab, Take 1 tablet (100 mg total) by mouth daily., Disp: 90 tablet, Rfl: 4    Tirzepatide-Weight Management (ZEPBOUND) 5 MG/0.5ML Subcutaneous Solution, Inject 5 mg into the skin once a week., Disp: 2 mL, Rfl: 2    ketoconazole 2 % External Cream, Apply 1 Application topically 2 (two) times daily., Disp: 60 g, Rfl: 5    Allergies:  Patient has no known allergies.     Social History:  Reviewed     Surgical History:    Past Surgical History:   Procedure Laterality Date      12/3/07;18    Other surgical history  11/10/2022    Vertical Sleeve Surgery       Family History:    Family History   Problem Relation Age of Onset    Diabetes Mother         Type 2    Hypertension Father     Diabetes Father         Type 2    Breast Cancer Sister 39    Cancer Sister         Breast cancer    Diabetes Maternal Grandfather         Type 2    Breast Cancer Maternal Aunt         post    Breast Cancer Maternal Aunt         post       Physical Exam:  General appearance: alert, appears stated age, cooperative and obese  Head: Normocephalic, without obvious abnormality, atraumatic  Neck: no adenopathy, no carotid bruit, no JVD, supple, symmetrical, trachea midline and thyroid not enlarged, symmetric, no tenderness/mass/nodules  Lungs: clear to auscultation bilaterally  Heart: S1, S2 normal, no murmur, click, rub or gallop, regular rate and rhythm  Abdomen: soft, non-tender; bowel sounds normal; no masses,  no organomegaly and abdomen obese   Extremities: intact, no edema   Pulses: 2+ and symmetric  Skin: intact    Neurologic: Grossly normal    ROS:    Constitutional: positive for fatigue  Respiratory: negative  Cardiovascular: negative  Gastrointestinal: negative  Genitourinary:negative  Integument/breast: positive for rash and under pannus and axillae  Hematologic/lymphatic: negative  Musculoskeletal:positive for back pain  Neurological: positive for headaches  Behavioral/Psych: positive for anxiety and depression  Endocrine: negative  All other systems were reviewed and are negative    Assessment       Encounter Diagnosis(ses):   Encounter Diagnoses   Name Primary?    Dyslipidemia Yes    Encounter for therapeutic drug monitoring     Morbid obesity with BMI of 45.0-49.9, adult (HCC)     Hx of migraines     History of sleeve gastrectomy     Intertrigo     Vitamin D deficiency          Plan     Date of Surgery:   2022  Surgery Type:   Sleeve gastrectomy   Surgery was in [place]   lbs   lbs   lbs, regaining     Intertrigo:  Apply medication to affected area BID.  Keep area dry and clean.    OBESITY:     Doing well post operatively.     Advised patient to continue to avoid pop, smoking, caffeine, and carbonated drinks.     Aim for 3 meals per day- eat protein first, followed by soft vegetables, fruits, and whole grains- must measure food.   Include 1-2 snacks per day as needed and to help meet protein needs throughout the day.      Exercise- aim for 150 minutes moderate level walking/week as tolerated.  Incorporate strength training 2-3 days/week.     Aim for adequate water intake > 64 oz/day.     Labs done 11/18/23.  Update fasting bariatric labs next month.      Meet with RD.    Switch to bariatric vitamin.     Denies personal or family hx medullary thyroid CA, endocrine neoplasia syndrome, pancreatitis hx, suicidal ideation. No renal impairment, severe GI disease, diabetes, pancreatitis risks noted.    Continue topiramate (ordered by pcp).  Hx migraines. Not controlled.   Increase to 25 mg BID.    Zepbound  covered.     Continue Zepbound 5 mg weekly.   Increase dose monthly as tolerated once constipation improves.     SQ administration teaching provided to patient.   Discussed risks, benefits, and side effects of medication. Avoid pregnancy during use. Contraindications for medication discussed at length. Patient states understanding.     Constipation protocol.    Stop senokot.    Start Doctor's Best powder with water in the evening for constipation and sleep.     Add kiwi daily.    Increase water intake.     If no improvement take miralax every 24 hours to at least 5 healthy bowel movements/week.    Consider daily stool softener or organic senna tea a few days/week.     No RD coverage.     RTC 3-4 months.      DEEPTI Treadwell

## 2025-04-02 ENCOUNTER — OFFICE VISIT (OUTPATIENT)
Dept: FAMILY MEDICINE CLINIC | Facility: CLINIC | Age: 45
End: 2025-04-02
Payer: COMMERCIAL

## 2025-04-02 VITALS
SYSTOLIC BLOOD PRESSURE: 103 MMHG | BODY MASS INDEX: 38.19 KG/M2 | OXYGEN SATURATION: 100 % | HEART RATE: 72 BPM | HEIGHT: 65.2 IN | TEMPERATURE: 98 F | DIASTOLIC BLOOD PRESSURE: 81 MMHG | WEIGHT: 232 LBS | RESPIRATION RATE: 18 BRPM

## 2025-04-02 DIAGNOSIS — J06.9 UPPER RESPIRATORY TRACT INFECTION, UNSPECIFIED TYPE: Primary | ICD-10-CM

## 2025-04-02 PROCEDURE — 3079F DIAST BP 80-89 MM HG: CPT | Performed by: NURSE PRACTITIONER

## 2025-04-02 PROCEDURE — 99213 OFFICE O/P EST LOW 20 MIN: CPT | Performed by: NURSE PRACTITIONER

## 2025-04-02 PROCEDURE — 3074F SYST BP LT 130 MM HG: CPT | Performed by: NURSE PRACTITIONER

## 2025-04-02 PROCEDURE — 87637 SARSCOV2&INF A&B&RSV AMP PRB: CPT | Performed by: NURSE PRACTITIONER

## 2025-04-02 PROCEDURE — 3008F BODY MASS INDEX DOCD: CPT | Performed by: NURSE PRACTITIONER

## 2025-04-02 NOTE — PROGRESS NOTES
CHIEF COMPLAINT:     Chief Complaint   Patient presents with    Cold     Cold started Sunday patient stated she feel like she going to pass out, very lightheaded, flashing in both eyes         HPI:   Hetal Weston is a 44 year old female presents to clinic with complaint of URI symptoms.  Onset 5 days ago.  Has seemed like a cold- cough, phlegm, little congestion, very fatigued, slight pain in left ear today.  Then today she was trying to cook and clean (as her kids have been on spring break), she was trying to make a soup today and was feeling light headed.  [place] like she was seeing spots and darkness, like she had to sit down and rest or she was going to pass out.  No syncope.  Reports has been tolerating PO well, urinating regularly.  Has been taking mucinex for cough.  No known ill contacts or travel.  Denies fever, dyspnea, wheezing, chest pain, SOB, GI complaints, or rashes.      Current Outpatient Medications   Medication Sig Dispense Refill    ZEPBOUND 5 MG/0.5ML Subcutaneous Solution INJECT 0.5 ML (5 MG) UNDER THE SKIN ONCE WEEKLY (0.5ML= 50 UNITS) 2 mL 2    topiramate 25 MG Oral Tab Take 1 tablet (25 mg total) by mouth 2 (two) times daily. 180 tablet 0    cholecalciferol 1.25 MG (12196 UT) Oral Cap Take 1 capsule (1.25 mg total) by mouth every 7 days. 4 capsule 3    sennosides 8.6 MG Oral Tab Take 1 tablet (8.6 mg total) by mouth 2 (two) times daily. 60 tablet 4    sertraline 50 MG Oral Tab Take 1 tablet (50 mg total) by mouth daily. 90 tablet 4    sertraline 100 MG Oral Tab Take 1 tablet (100 mg total) by mouth daily. 90 tablet 4    ketoconazole 2 % External Cream Apply 1 Application topically 2 (two) times daily. 60 g 5      Past Medical History:    Anemia    with first pregnancy    Anxiety    Gestational diabetes (HCC)    Morbid obesity with BMI of 45.0-49.9, adult (HCC)    Tension headache    Varicella    Had chicken pox childhood      Social History:  Social History     Socioeconomic History     Marital status:    Tobacco Use    Smoking status: Never    Smokeless tobacco: Never   Vaping Use    Vaping status: Never Used   Substance and Sexual Activity    Alcohol use: Yes     Comment: Socially sometimes, a few times a year    Drug use: No   Other Topics Concern    Caffeine Concern No     Comment: Coffee/Soda        REVIEW OF SYSTEMS:   GENERAL HEALTH: See HPI  SKIN: denies any unusual skin lesions or rashes  HEENT: denies ear pain or difficulty swallowing/eating. See HPI  RESPIRATORY: denies shortness of breath or wheezing  CARDIOVASCULAR: denies chest pain or palpitations   GI: denies vomiting or diarrhea  NEURO: See HPI    EXAM:   /81 (BP Location: Right arm, Patient Position: Sitting, Cuff Size: large)   Pulse 72   Temp 98 °F (36.7 °C) (Oral)   Resp 18   Ht 5' 5.2\" (1.656 m)   Wt 232 lb (105.2 kg)   LMP 03/18/2025   SpO2 100%   BMI 38.37 kg/m²   GENERAL: Well-appearing, well developed, well nourished, in no apparent distress  SKIN: no rashes, no suspicious lesions  HEAD: atraumatic, normocephalic  EYES: conjunctiva clear, EOM intact  EARS: TM's clear, non-injected, no bulging, or retraction.  +Small fluid bilaterally.  NOSE: nostrils patent, no exudates, nasal mucosa pink and noninflamed  THROAT: oral mucosa pink, moist. Posterior pharynx erythematous and injected. No exudates.   NECK: supple, non-tender  LUNGS: clear to auscultation bilaterally, no wheezes or rhonchi. Breathing is non labored. +Dry cough.  CARDIO: RRR without murmur  LYMPH: No lymphadenopathy.  NEUROLOGICAL: A&O x3.  Cranial nerves grossly intact.  Gait- normal.  No slurred speech, no facial droop.    No results found for this or any previous visit (from the past 24 hours).      ASSESSMENT AND PLAN:   Assessment:   Hetal was seen today for cold.    Diagnoses and all orders for this visit:    Upper respiratory tract infection, unspecified type  -     SARS-CoV-2/Flu A and B/RSV by PCR ([facility]) [E] *Collect in Office!;  Future  -     SARS-CoV-2/Flu A and B/RSV by PCR (Malik) [E] *Collect in Office!          Plan:   - Pt felt pre-syncopal earlier today while trying to cook/clean at home.  Suspect had tried over- doing it as she has been sick the past few days.  Exam reassuring, vitals stable.  Tolerating PO and well-hydrated.  - Quad respiratory panel sent.  - Advised to push fluids and increase rest.  Slow position changes.  Small meals frequently.  - Comfort measures explained and discussed as listed in Patient Instructions.  - Advised follow up in 3-5 days if not improving, condition worsens, or new/persistent fevers.  - Advised to proceed to ER if ever dyspnea, chest pain, syncope, severe dizziness.  - Pt verbalizes understanding and is agreeable w/ plan.    There are no Patient Instructions on file for this visit.

## 2025-04-03 LAB
FLUAV + FLUBV RNA SPEC NAA+PROBE: DETECTED
FLUAV + FLUBV RNA SPEC NAA+PROBE: NOT DETECTED
RSV RNA SPEC NAA+PROBE: NOT DETECTED
SARS-COV-2 RNA RESP QL NAA+PROBE: NOT DETECTED

## 2025-04-24 DIAGNOSIS — E66.01 MORBID OBESITY WITH BMI OF 45.0-49.9, ADULT (HCC): ICD-10-CM

## 2025-04-24 RX ORDER — TIRZEPATIDE 5 MG/.5ML
INJECTION, SOLUTION SUBCUTANEOUS
Qty: 2 ML | Refills: 2 | Status: SHIPPED | OUTPATIENT
Start: 2025-04-24

## 2025-04-25 DIAGNOSIS — E66.9 OBESITY (BMI 30-39.9): Primary | ICD-10-CM

## 2025-04-25 RX ORDER — TIRZEPATIDE 7.5 MG/.5ML
7.5 INJECTION, SOLUTION SUBCUTANEOUS WEEKLY
Qty: 2 ML | Refills: 3 | Status: SHIPPED | OUTPATIENT
Start: 2025-04-25

## 2025-06-18 DIAGNOSIS — E55.9 VITAMIN D DEFICIENCY: ICD-10-CM

## 2025-06-19 RX ORDER — FOLIC ACID 1 MG/1
1 TABLET ORAL
Qty: 4 CAPSULE | Refills: 3 | Status: SHIPPED | OUTPATIENT
Start: 2025-06-19

## 2025-06-20 ENCOUNTER — HOSPITAL ENCOUNTER (OUTPATIENT)
Dept: MAMMOGRAPHY | Age: 45
Discharge: HOME | End: 2025-06-20
Attending: FAMILY MEDICINE
Payer: COMMERCIAL

## 2025-06-20 DIAGNOSIS — Z12.31 SCREENING MAMMOGRAM FOR BREAST CANCER: ICD-10-CM

## 2025-06-20 PROCEDURE — 77067 SCR MAMMO BI INCL CAD: CPT | Performed by: FAMILY MEDICINE

## 2025-06-20 PROCEDURE — 77063 BREAST TOMOSYNTHESIS BI: CPT | Performed by: FAMILY MEDICINE

## 2025-06-23 ENCOUNTER — OFFICE VISIT (OUTPATIENT)
Dept: SURGERY | Facility: CLINIC | Age: 45
End: 2025-06-23
Payer: COMMERCIAL

## 2025-06-23 VITALS
BODY MASS INDEX: 37.42 KG/M2 | HEIGHT: 65.2 IN | SYSTOLIC BLOOD PRESSURE: 106 MMHG | OXYGEN SATURATION: 98 % | HEART RATE: 81 BPM | WEIGHT: 227.31 LBS | DIASTOLIC BLOOD PRESSURE: 80 MMHG

## 2025-06-23 DIAGNOSIS — E55.9 VITAMIN D DEFICIENCY: ICD-10-CM

## 2025-06-23 DIAGNOSIS — L30.4 INTERTRIGO: ICD-10-CM

## 2025-06-23 DIAGNOSIS — Z86.69 HX OF MIGRAINES: ICD-10-CM

## 2025-06-23 DIAGNOSIS — E66.9 OBESITY (BMI 30-39.9): Primary | ICD-10-CM

## 2025-06-23 DIAGNOSIS — E78.5 DYSLIPIDEMIA: Primary | ICD-10-CM

## 2025-06-23 DIAGNOSIS — Z90.3 HISTORY OF SLEEVE GASTRECTOMY: ICD-10-CM

## 2025-06-23 DIAGNOSIS — E66.9 OBESITY (BMI 30-39.9): ICD-10-CM

## 2025-06-23 DIAGNOSIS — Z51.81 ENCOUNTER FOR THERAPEUTIC DRUG MONITORING: ICD-10-CM

## 2025-06-23 PROCEDURE — 3079F DIAST BP 80-89 MM HG: CPT | Performed by: NURSE PRACTITIONER

## 2025-06-23 PROCEDURE — 3074F SYST BP LT 130 MM HG: CPT | Performed by: NURSE PRACTITIONER

## 2025-06-23 PROCEDURE — 3008F BODY MASS INDEX DOCD: CPT | Performed by: NURSE PRACTITIONER

## 2025-06-23 PROCEDURE — 99214 OFFICE O/P EST MOD 30 MIN: CPT | Performed by: NURSE PRACTITIONER

## 2025-06-23 RX ORDER — TIRZEPATIDE 10 MG/.5ML
10 INJECTION, SOLUTION SUBCUTANEOUS WEEKLY
Qty: 2 ML | Refills: 3 | Status: SHIPPED | OUTPATIENT
Start: 2025-06-23

## 2025-06-23 NOTE — PROGRESS NOTES
[facility], [facility], [place]  [address]  [facility] [address]  Dept: [phone]    Patient:  Hetal Weston  :      1980  MRN:      [id number]    Referring Provider: David     Chief Complaint:     Chief Complaint   Patient presents with    Follow - Up    Weight Management     Date of Surgery:     Surgery Type:   Sleeve gastrectomy   Surgery was in [place]   lbs   lbs   lbs, regaining     Subjective     Tolerating Zepbound 7.5 mg weekly- one month into this dose.   Constipation has resolved.     Satiety:  Positive    Food Intake:    2-3 meals/day  Apples, grapes, add 1 kiwi/day  Aims for adequate protein  Tracks food intake     Fluid Intake:    Increase to 64 oz/day  Small sips, has difficulty with water since surgery    Protein Intake:    Aim for 90 grams protein/day    Vitamin:  Yes   Bariatric MVI, calcium    Exercise: Yes   Walks, limited by back pain  Plans to start PT  Add sitting weights     Sleep: Interrupted due to mind racing- improved with topiramate    Comorbidities:  None    Initial HPI:  43 yo female.  Presents to clinic for assistance with weight loss/maintenance.   Hx sleeve gastrectomy with weight regain.  Reports ongoing back pain- fell off parents' garage roof in 2024.  Referred by Dr. Hernandez.     Occupation:   Lives with: , 2 kids  Previous weight loss medication: metformin, phentermine     Objective     Vitals: /80 (BP Location: Left arm, Patient Position: Sitting, Cuff Size: large)   Pulse 81   Ht 5' 5.2\" (1.656 m)   Wt 227 lb 4.8 oz (103.1 kg)   LMP 2025 (Approximate)   SpO2 98%   BMI 37.59 kg/m²     Starting weight: 314   Current weight:  227   Interval weight loss: -18   Total weight loss:  -87   GW under 200 lbs     Last 3 Weights   25 1217 227 lb 4.8 oz (103.1 kg)   25 1658 232 lb (105.2 kg)   25 1027 245 lb (111.1 kg)        Patient Medications:    Current Outpatient Medications:     Tirzepatide-Weight Management (ZEPBOUND) 10 MG/0.5ML Subcutaneous Solution Auto-injector, Inject 10 mg into the skin once a week., Disp: 2 mL, Rfl: 3    VITAMIN D3 1.25 MG (59397 UT) Oral Cap, TAKE 1 CAPSULE BY MOUTH EVERY 7 DAYS, Disp: 4 capsule, Rfl: 3    Tirzepatide-Weight Management (ZEPBOUND) 7.5 MG/0.5ML Subcutaneous Solution, Inject 7.5 mg into the skin once a week., Disp: 2 mL, Rfl: 3    topiramate 25 MG Oral Tab, Take 1 tablet (25 mg total) by mouth 2 (two) times daily., Disp: 180 tablet, Rfl: 0    sennosides 8.6 MG Oral Tab, Take 1 tablet (8.6 mg total) by mouth 2 (two) times daily., Disp: 60 tablet, Rfl: 4    sertraline 50 MG Oral Tab, Take 1 tablet (50 mg total) by mouth daily., Disp: 90 tablet, Rfl: 4    sertraline 100 MG Oral Tab, Take 1 tablet (100 mg total) by mouth daily., Disp: 90 tablet, Rfl: 4    ketoconazole 2 % External Cream, Apply 1 Application topically 2 (two) times daily., Disp: 60 g, Rfl: 5    Allergies:  Patient has no known allergies.     Social History:  Reviewed     Surgical History:    Past Surgical History:   Procedure Laterality Date      12/3/07;18    Other surgical history  11/10/2022    Vertical Sleeve Surgery       Family History:    Family History   Problem Relation Age of Onset    Diabetes Mother         Type 2    Hypertension Father     Diabetes Father         Type 2    Breast Cancer Sister 39    Cancer Sister         Breast cancer    Diabetes Maternal Grandfather         Type 2    Breast Cancer Maternal Aunt         post    Breast Cancer Maternal Aunt         post    Ovarian Cancer Neg     Pancreatic Cancer Neg     Prostate Cancer Neg        Physical Exam:  General appearance: alert, appears stated age, cooperative and obese  Head: Normocephalic, without obvious abnormality, atraumatic  Neck: no adenopathy, no carotid bruit, no JVD, supple, symmetrical, trachea midline and thyroid not enlarged,  symmetric, no tenderness/mass/nodules  Lungs: clear to auscultation bilaterally  Heart: S1, S2 normal, no murmur, click, rub or gallop, regular rate and rhythm  Abdomen: soft, non-tender; bowel sounds normal; no masses,  no organomegaly and abdomen obese   Extremities: intact, no edema   Pulses: 2+ and symmetric  Skin: intact   Neurologic: Grossly normal    ROS:    Constitutional: positive for fatigue  Respiratory: negative  Cardiovascular: negative  Gastrointestinal: negative  Genitourinary:negative  Integument/breast: positive for rash and under pannus and axillae  Hematologic/lymphatic: negative  Musculoskeletal:positive for back pain  Neurological: positive for headaches  Behavioral/Psych: positive for anxiety and depression  Endocrine: negative  All other systems were reviewed and are negative    Assessment       Encounter Diagnosis(ses):   Encounter Diagnoses   Name Primary?    Dyslipidemia Yes    Encounter for therapeutic drug monitoring     History of sleeve gastrectomy     Obesity (BMI 30-39.9)     Vitamin D deficiency     Hx of migraines     Intertrigo          Plan     Date of Surgery:   2022  Surgery Type:   Sleeve gastrectomy   Surgery was in [place]   lbs   lbs   lbs, regaining       DYSLIPIDEMIA: Recommend dietary changes and lifestyle modifications as discussed below. Monitor.       Lab Results   Component Value Date/Time    CHOLEST 200 (H) 12/21/2024 08:55 AM     (H) 12/21/2024 08:55 AM    HDL 35 (L) 12/21/2024 08:55 AM    TRIG 149 12/21/2024 08:55 AM    VLDL 28 12/21/2024 08:55 AM       Intertrigo:  Apply medication to affected area BID.  Keep area dry and clean.    OBESITY:     Doing well post operatively.     Advised patient to continue to avoid pop, smoking, caffeine, and carbonated drinks.     Aim for 3 meals per day- eat protein first, followed by soft vegetables, fruits, and whole grains- must measure food.   Include 1-2 snacks per day as needed and to help meet  protein needs throughout the day.      Exercise- aim for 150 minutes moderate level walking/week as tolerated.  Incorporate strength training 2-3 days/week.     Aim for adequate water intake > 64 oz/day.     Labs done:  12/21/24-  Low ferritin- supplement daily bariatric vitamin + iron.  Vitamin D low- supplement daily bariatric vitamin. 4 months weekly.   Cholesterol elevated, but improving.  No other significant abnormalities: CMP, iron, B 1, thyroid studies, a1c, B 12, folate, CBC     Complete Metabolic Endeavors Program.     Switched to bariatric vitamin. Continue along with daily calcium.      Denies personal or family hx medullary thyroid CA, endocrine neoplasia syndrome, pancreatitis hx, suicidal ideation. No renal impairment, severe GI disease, diabetes, pancreatitis risks noted.    Continue topiramate (ordered by pcp).  Hx migraines. Not controlled.   Increase to 25 mg BID.    Zepbound covered.     Continue Zepbound 7.5 mg weekly.   Increase dose to 10 mg weekly at time of refill.   Increase dose monthly as tolerated.  Monitor constipation.      SQ administration teaching provided to patient.   Discussed risks, benefits, and side effects of medication. Avoid pregnancy during use. Contraindications for medication discussed at length. Patient states understanding.     Constipation protocol:  Continue Doctor's Best in the evening for constipation and sleep.   Recommend kiwi daily.  Aim for adequate water intake.   If no improvement take miralax every 24 hours to at least 5 healthy bowel movements/week.  Consider daily stool softener or organic senna tea a few days/week as needed.     No RD coverage.     RTC 4 months.      DEEPTI Treadwell

## 2025-06-25 DIAGNOSIS — E66.9 OBESITY (BMI 30-39.9): ICD-10-CM

## 2025-06-25 RX ORDER — TIRZEPATIDE 10 MG/.5ML
10 INJECTION, SOLUTION SUBCUTANEOUS WEEKLY
Qty: 2 ML | Refills: 3 | Status: SHIPPED | OUTPATIENT
Start: 2025-06-25

## 2025-07-06 DIAGNOSIS — Z86.69 HX OF MIGRAINES: ICD-10-CM

## 2025-07-06 DIAGNOSIS — E66.01 MORBID OBESITY WITH BMI OF 45.0-49.9, ADULT (HCC): ICD-10-CM

## 2025-07-07 RX ORDER — TOPIRAMATE 25 MG/1
25 TABLET, FILM COATED ORAL 2 TIMES DAILY
Qty: 180 TABLET | Refills: 1 | Status: SHIPPED | OUTPATIENT
Start: 2025-07-07

## 2025-07-22 ENCOUNTER — OFFICE VISIT (OUTPATIENT)
Dept: OTOLARYNGOLOGY | Facility: CLINIC | Age: 45
End: 2025-07-22
Payer: COMMERCIAL

## 2025-07-22 VITALS — WEIGHT: 227 LBS | BODY MASS INDEX: 37.37 KG/M2 | HEIGHT: 65.2 IN

## 2025-07-22 DIAGNOSIS — H61.23 CERUMEN DEBRIS ON TYMPANIC MEMBRANE OF BOTH EARS: Primary | ICD-10-CM

## 2025-07-22 PROCEDURE — 3008F BODY MASS INDEX DOCD: CPT | Performed by: SPECIALIST

## 2025-07-22 PROCEDURE — 99212 OFFICE O/P EST SF 10 MIN: CPT | Performed by: SPECIALIST

## 2025-07-22 RX ORDER — RIZATRIPTAN BENZOATE 10 MG/1
10 TABLET, ORALLY DISINTEGRATING ORAL AS NEEDED
COMMUNITY
Start: 2025-06-22

## 2025-07-22 NOTE — PROGRESS NOTES
Hetal Weston is a 45 year old female.   Chief Complaint   Patient presents with    Ear Wax     Ear cleaning     HPI:   Patient here to get her ears cleaned and checked    Current Medications[1]   Past Medical History[2]   Social History:  Short Social Hx on File[3]     REVIEW OF SYSTEMS:   GENERAL HEALTH: feels well otherwise  GENERAL : denies fever, chills, sweats, weight loss, weight gain  SKIN: denies any unusual skin lesions or rashes  RESPIRATORY: denies shortness of breath with exertion  NEURO: denies headaches    EXAM:   Ht 5' 5.2\" (1.656 m)   Wt 227 lb (103 kg)   LMP 06/06/2025 (Approximate)   BMI 37.54 kg/m²   System Details   Skin Inspection - Normal.   Constitutional Overall appearance - Normal.   Head/Face Facial features - Normal. Eyebrows - Normal. Skull - Normal.   Eyes Conjunctiva - Right: Normal, Left: Normal. Pupil - Right: Normal, Left: Normal.    Ears Inspection - Right: Normal, Left: Normal.   Canal -nonocclusive cerumen fully cleaned  TM - Right: Normal, Left: Normal.   Nasal External nose - Normal.      Oral/Oropharynx Lips - Normal   Neck Exam Inspection - Normal. Palpation - Normal. Parotid gland - Normal. Thyroid gland - Normal.   Lymph Detail Submental. Submandibular. Anterior cervical. Posterior cervical. Supraclavicular all without enlargement   Psychiatric Orientation - Oriented to time, place, person & situation. Appropriate mood and affect.   Neurological Memory - Normal. Cranial nerves - Cranial nerves II through XII grossly intact.     ASSESSMENT AND PLAN:   1. Cerumen debris on tympanic membrane of both ears  Fully cleaned.  Follow-up in 1 years time, sooner if problems.      The patient indicates understanding of these issues and agrees to the plan.      Erendira Winters MD  7/22/2025  4:50 PM       [1]   Current Outpatient Medications   Medication Sig Dispense Refill    rizatriptan 10 MG Oral Tablet Dispersible Take 1 tablet (10 mg total) by mouth as needed for Migraine.       topiramate 25 MG Oral Tab Take 1 tablet (25 mg total) by mouth 2 (two) times daily. 180 tablet 1    Tirzepatide-Weight Management (ZEPBOUND) 10 MG/0.5ML Subcutaneous Solution Inject 10 mg into the skin once a week. 2 mL 3    Tirzepatide-Weight Management (ZEPBOUND) 10 MG/0.5ML Subcutaneous Solution Auto-injector Inject 10 mg into the skin once a week. 2 mL 3    VITAMIN D3 1.25 MG (08777 UT) Oral Cap TAKE 1 CAPSULE BY MOUTH EVERY 7 DAYS 4 capsule 3    Tirzepatide-Weight Management (ZEPBOUND) 7.5 MG/0.5ML Subcutaneous Solution Inject 7.5 mg into the skin once a week. 2 mL 3    sennosides 8.6 MG Oral Tab Take 1 tablet (8.6 mg total) by mouth 2 (two) times daily. 60 tablet 4    sertraline 50 MG Oral Tab Take 1 tablet (50 mg total) by mouth daily. 90 tablet 4    sertraline 100 MG Oral Tab Take 1 tablet (100 mg total) by mouth daily. 90 tablet 4    ketoconazole 2 % External Cream Apply 1 Application topically 2 (two) times daily. 60 g 5   [2]   Past Medical History:   Anemia    with first pregnancy    Anxiety    Gestational diabetes (HCC)    Morbid obesity with BMI of 45.0-49.9, adult (HCC)    Tension headache    Varicella    Had chicken pox childhood   [3]   Social History  Socioeconomic History    Marital status:    Tobacco Use    Smoking status: Never    Smokeless tobacco: Never   Vaping Use    Vaping status: Never Used   Substance and Sexual Activity    Alcohol use: Yes     Comment: Socially sometimes, a few times a year    Drug use: No   Other Topics Concern    Caffeine Concern No     Comment: Coffee/Soda

## 2025-07-30 DIAGNOSIS — E66.01 MORBID OBESITY WITH BMI OF 45.0-49.9, ADULT (HCC): Primary | ICD-10-CM

## 2025-07-30 RX ORDER — TIRZEPATIDE 12.5 MG/.5ML
12.5 INJECTION, SOLUTION SUBCUTANEOUS WEEKLY
Qty: 2 ML | Refills: 1 | Status: SHIPPED | OUTPATIENT
Start: 2025-07-30 | End: 2025-07-30

## (undated) NOTE — Clinical Note
I agree with the planned sleep study  Level II at 20-22 weeks. Monthly Growth ultrasound starting at 28 weeks Weekly NST's at 36 weeks.  Refer to dietician for education about healthy diet in pregnancy

## (undated) NOTE — LETTER
6/9/2018             RE: Laurel Lauri        [address]        [facility] [address]             To Whom It May Concern,      Priscilla Carrera has been diagnosed with diabetes in pregnancy and this was confirmed with a 3 hour glucose tolerance test.  [place]

## (undated) NOTE — LETTER
VACCINE ADMINISTRATION RECORD  PARENT / GUARDIAN APPROVAL  Date: 2018  Vaccine administered to: Aisha Reed     : 1980    MRN: [id number]    A copy of the appropriate Centers for Disease Control and Prevention Vaccine Information statement h

## (undated) NOTE — LETTER
[address]  [address] [facility]  Dept: [phone]  Dept Fax: [phone]  Loc: [phone]      February 2, 2017    Patient: Carol Davison   Date of Visit: 2/2/2017       To Whom It May Concern:    Kaci Landis

## (undated) NOTE — LETTER
AUTHORIZATION FOR SURGICAL OPERATION OR OTHER PROCEDURE    1. I hereby authorize Dr. Baljit Figueredo, and [address] [place] [address] staff assigned to my case to perform the following operation and/or procedure at the [address] [place] [address]:         IUD Insertion    2.   My physici Relationship to Patient:           []  Parent    Responsible person                          []  Spouse  In case of minor or                    [] Other  _____________   Incompetent name:  __________________________________________________

## (undated) NOTE — Clinical Note
I agree with the planned sleep study  Monthly Growth ultrasound starting at 28 weeks Weekly NST's at 36 weeks.  Refer to dietician for education about healthy diet in pregnancy

## (undated) NOTE — ED AVS SNAPSHOT
[facility] in [facility] [address]  [address] [facility]    Phone:  [phone]    Fax:  [phone]           Cl Her   MRN: [id number]    Department:  [facility] in [address] [place] [address]   Date of Visit:  [address]  [phone], [phone]  [address] [facility] [address]     Phone:  [phone]    - Metoclopramide HCl 10 MG Tabs  - ondansetron 4 MG Tbdp              Discharge Instructions       Ibuprofen 600 mg every 6 hours if needed care or specialist physician may see patients referred from the [facility]. Follow-up care is at the discretion of that Physician.   If you need additional assistance selecting a physician, you may call the [facility] [employer] [facility] [address]  [place] [address]) [phone]                Additional Information       We are concerned for your overall well being:    - If you are a smoker or have smoked in the last 12 months, we encourage you to explore op